# Patient Record
Sex: MALE | Race: WHITE | NOT HISPANIC OR LATINO | Employment: OTHER | URBAN - METROPOLITAN AREA
[De-identification: names, ages, dates, MRNs, and addresses within clinical notes are randomized per-mention and may not be internally consistent; named-entity substitution may affect disease eponyms.]

---

## 2022-09-29 ENCOUNTER — TELEPHONE (OUTPATIENT)
Dept: SCHEDULING | Facility: IMAGING CENTER | Age: 57
End: 2022-09-29

## 2022-09-30 SDOH — HEALTH STABILITY: PHYSICAL HEALTH: ON AVERAGE, HOW MANY MINUTES DO YOU ENGAGE IN EXERCISE AT THIS LEVEL?: 20 MIN

## 2022-09-30 SDOH — ECONOMIC STABILITY: HOUSING INSECURITY
IN THE LAST 12 MONTHS, WAS THERE A TIME WHEN YOU DID NOT HAVE A STEADY PLACE TO SLEEP OR SLEPT IN A SHELTER (INCLUDING NOW)?: NO

## 2022-09-30 SDOH — ECONOMIC STABILITY: TRANSPORTATION INSECURITY
IN THE PAST 12 MONTHS, HAS LACK OF TRANSPORTATION KEPT YOU FROM MEETINGS, WORK, OR FROM GETTING THINGS NEEDED FOR DAILY LIVING?: NO

## 2022-09-30 SDOH — ECONOMIC STABILITY: HOUSING INSECURITY: IN THE LAST 12 MONTHS, HOW MANY PLACES HAVE YOU LIVED?: 2

## 2022-09-30 SDOH — ECONOMIC STABILITY: FOOD INSECURITY: WITHIN THE PAST 12 MONTHS, YOU WORRIED THAT YOUR FOOD WOULD RUN OUT BEFORE YOU GOT MONEY TO BUY MORE.: NEVER TRUE

## 2022-09-30 SDOH — HEALTH STABILITY: PHYSICAL HEALTH: ON AVERAGE, HOW MANY DAYS PER WEEK DO YOU ENGAGE IN MODERATE TO STRENUOUS EXERCISE (LIKE A BRISK WALK)?: 4 DAYS

## 2022-09-30 SDOH — ECONOMIC STABILITY: INCOME INSECURITY: IN THE LAST 12 MONTHS, WAS THERE A TIME WHEN YOU WERE NOT ABLE TO PAY THE MORTGAGE OR RENT ON TIME?: NO

## 2022-09-30 SDOH — HEALTH STABILITY: MENTAL HEALTH
STRESS IS WHEN SOMEONE FEELS TENSE, NERVOUS, ANXIOUS, OR CAN'T SLEEP AT NIGHT BECAUSE THEIR MIND IS TROUBLED. HOW STRESSED ARE YOU?: VERY MUCH

## 2022-09-30 SDOH — ECONOMIC STABILITY: FOOD INSECURITY: WITHIN THE PAST 12 MONTHS, THE FOOD YOU BOUGHT JUST DIDN'T LAST AND YOU DIDN'T HAVE MONEY TO GET MORE.: NEVER TRUE

## 2022-09-30 SDOH — ECONOMIC STABILITY: TRANSPORTATION INSECURITY
IN THE PAST 12 MONTHS, HAS THE LACK OF TRANSPORTATION KEPT YOU FROM MEDICAL APPOINTMENTS OR FROM GETTING MEDICATIONS?: NO

## 2022-09-30 SDOH — ECONOMIC STABILITY: INCOME INSECURITY: HOW HARD IS IT FOR YOU TO PAY FOR THE VERY BASICS LIKE FOOD, HOUSING, MEDICAL CARE, AND HEATING?: NOT HARD AT ALL

## 2022-09-30 SDOH — ECONOMIC STABILITY: TRANSPORTATION INSECURITY
IN THE PAST 12 MONTHS, HAS LACK OF RELIABLE TRANSPORTATION KEPT YOU FROM MEDICAL APPOINTMENTS, MEETINGS, WORK OR FROM GETTING THINGS NEEDED FOR DAILY LIVING?: NO

## 2022-09-30 ASSESSMENT — SOCIAL DETERMINANTS OF HEALTH (SDOH)
HOW OFTEN DO YOU GET TOGETHER WITH FRIENDS OR RELATIVES?: NEVER
HOW OFTEN DO YOU HAVE SIX OR MORE DRINKS ON ONE OCCASION: NEVER
IN A TYPICAL WEEK, HOW MANY TIMES DO YOU TALK ON THE PHONE WITH FAMILY, FRIENDS, OR NEIGHBORS?: ONCE A WEEK
DO YOU BELONG TO ANY CLUBS OR ORGANIZATIONS SUCH AS CHURCH GROUPS UNIONS, FRATERNAL OR ATHLETIC GROUPS, OR SCHOOL GROUPS?: YES
HOW OFTEN DO YOU ATTENT MEETINGS OF THE CLUB OR ORGANIZATION YOU BELONG TO?: NEVER
HOW OFTEN DO YOU HAVE A DRINK CONTAINING ALCOHOL: NEVER
HOW OFTEN DO YOU ATTENT MEETINGS OF THE CLUB OR ORGANIZATION YOU BELONG TO?: NEVER
HOW OFTEN DO YOU GET TOGETHER WITH FRIENDS OR RELATIVES?: NEVER
HOW OFTEN DO YOU ATTEND CHURCH OR RELIGIOUS SERVICES?: MORE THAN 4 TIMES PER YEAR
HOW MANY DRINKS CONTAINING ALCOHOL DO YOU HAVE ON A TYPICAL DAY WHEN YOU ARE DRINKING: PATIENT DOES NOT DRINK
DO YOU BELONG TO ANY CLUBS OR ORGANIZATIONS SUCH AS CHURCH GROUPS UNIONS, FRATERNAL OR ATHLETIC GROUPS, OR SCHOOL GROUPS?: YES
IN A TYPICAL WEEK, HOW MANY TIMES DO YOU TALK ON THE PHONE WITH FAMILY, FRIENDS, OR NEIGHBORS?: ONCE A WEEK
HOW OFTEN DO YOU ATTEND CHURCH OR RELIGIOUS SERVICES?: MORE THAN 4 TIMES PER YEAR
WITHIN THE PAST 12 MONTHS, YOU WORRIED THAT YOUR FOOD WOULD RUN OUT BEFORE YOU GOT THE MONEY TO BUY MORE: NEVER TRUE
HOW HARD IS IT FOR YOU TO PAY FOR THE VERY BASICS LIKE FOOD, HOUSING, MEDICAL CARE, AND HEATING?: NOT HARD AT ALL

## 2022-09-30 ASSESSMENT — LIFESTYLE VARIABLES
HOW OFTEN DO YOU HAVE SIX OR MORE DRINKS ON ONE OCCASION: NEVER
HOW OFTEN DO YOU HAVE A DRINK CONTAINING ALCOHOL: NEVER
SKIP TO QUESTIONS 9-10: 1
AUDIT-C TOTAL SCORE: 0
HOW MANY STANDARD DRINKS CONTAINING ALCOHOL DO YOU HAVE ON A TYPICAL DAY: PATIENT DOES NOT DRINK

## 2022-10-03 ENCOUNTER — OFFICE VISIT (OUTPATIENT)
Dept: MEDICAL GROUP | Facility: PHYSICIAN GROUP | Age: 57
End: 2022-10-03
Payer: OTHER GOVERNMENT

## 2022-10-03 VITALS
OXYGEN SATURATION: 98 % | WEIGHT: 242 LBS | TEMPERATURE: 97 F | DIASTOLIC BLOOD PRESSURE: 90 MMHG | RESPIRATION RATE: 16 BRPM | SYSTOLIC BLOOD PRESSURE: 134 MMHG | BODY MASS INDEX: 32.07 KG/M2 | HEART RATE: 100 BPM | HEIGHT: 73 IN

## 2022-10-03 DIAGNOSIS — I10 PRIMARY HYPERTENSION: ICD-10-CM

## 2022-10-03 DIAGNOSIS — E78.2 MIXED HYPERLIPIDEMIA: ICD-10-CM

## 2022-10-03 DIAGNOSIS — F43.10 PTSD (POST-TRAUMATIC STRESS DISORDER): ICD-10-CM

## 2022-10-03 DIAGNOSIS — M54.42 CHRONIC BILATERAL LOW BACK PAIN WITH LEFT-SIDED SCIATICA: ICD-10-CM

## 2022-10-03 DIAGNOSIS — G89.29 CHRONIC BILATERAL LOW BACK PAIN WITH LEFT-SIDED SCIATICA: ICD-10-CM

## 2022-10-03 DIAGNOSIS — Z85.89 HX OF SQUAMOUS CELL CARCINOMA: ICD-10-CM

## 2022-10-03 DIAGNOSIS — Z00.00 PHYSICAL EXAM, ANNUAL: ICD-10-CM

## 2022-10-03 DIAGNOSIS — Z23 NEED FOR VACCINATION: ICD-10-CM

## 2022-10-03 PROBLEM — S42.411A CLOSED SUPRACONDYLAR FRACTURE OF RIGHT HUMERUS: Status: ACTIVE | Noted: 2020-11-08

## 2022-10-03 PROCEDURE — 96372 THER/PROPH/DIAG INJ SC/IM: CPT | Performed by: PHYSICIAN ASSISTANT

## 2022-10-03 PROCEDURE — 90686 IIV4 VACC NO PRSV 0.5 ML IM: CPT | Performed by: PHYSICIAN ASSISTANT

## 2022-10-03 PROCEDURE — 90471 IMMUNIZATION ADMIN: CPT | Performed by: PHYSICIAN ASSISTANT

## 2022-10-03 PROCEDURE — 99204 OFFICE O/P NEW MOD 45 MIN: CPT | Mod: 25 | Performed by: PHYSICIAN ASSISTANT

## 2022-10-03 RX ORDER — LEVOFLOXACIN 750 MG/1
TABLET, FILM COATED ORAL
COMMUNITY
End: 2023-01-10

## 2022-10-03 RX ORDER — METHOCARBAMOL 500 MG/1
TABLET, FILM COATED ORAL
COMMUNITY
Start: 2022-07-21 | End: 2023-01-10

## 2022-10-03 RX ORDER — FLUTICASONE PROPIONATE 110 UG/1
AEROSOL, METERED RESPIRATORY (INHALATION)
COMMUNITY
End: 2023-01-10

## 2022-10-03 RX ORDER — ACETAMINOPHEN AND CODEINE PHOSPHATE 300; 30 MG/1; MG/1
TABLET ORAL
COMMUNITY
End: 2023-08-01

## 2022-10-03 RX ORDER — BUSPIRONE HYDROCHLORIDE 5 MG/1
TABLET ORAL
COMMUNITY
End: 2023-01-10

## 2022-10-03 RX ORDER — ZOSTER VACCINE RECOMBINANT, ADJUVANTED 50 MCG/0.5
KIT INTRAMUSCULAR
COMMUNITY
End: 2023-01-10

## 2022-10-03 RX ORDER — KETOROLAC TROMETHAMINE 30 MG/ML
30 INJECTION, SOLUTION INTRAMUSCULAR; INTRAVENOUS ONCE
Status: COMPLETED | OUTPATIENT
Start: 2022-10-03 | End: 2022-10-03

## 2022-10-03 RX ORDER — ATORVASTATIN CALCIUM 20 MG/1
TABLET, FILM COATED ORAL
COMMUNITY
End: 2023-10-31

## 2022-10-03 RX ORDER — HYDROCODONE BITARTRATE AND ACETAMINOPHEN 10; 325 MG/1; MG/1
TABLET ORAL
COMMUNITY
End: 2023-01-10

## 2022-10-03 RX ORDER — PANTOPRAZOLE SODIUM 40 MG/1
TABLET, DELAYED RELEASE ORAL
COMMUNITY
End: 2023-10-31 | Stop reason: SDUPTHER

## 2022-10-03 RX ORDER — TRAMADOL HYDROCHLORIDE 50 MG/1
TABLET ORAL
COMMUNITY
End: 2023-01-10

## 2022-10-03 RX ORDER — IBUPROFEN 200 MG
800 TABLET ORAL
COMMUNITY

## 2022-10-03 RX ORDER — OXYCODONE HYDROCHLORIDE AND ACETAMINOPHEN 5; 325 MG/1; MG/1
TABLET ORAL
COMMUNITY
End: 2023-01-10

## 2022-10-03 RX ORDER — ENALAPRIL MALEATE AND HYDROCHLOROTHIAZIDE 10; 25 MG/1; MG/1
1 TABLET ORAL DAILY
COMMUNITY
Start: 2022-07-12 | End: 2023-05-08 | Stop reason: SDUPTHER

## 2022-10-03 RX ORDER — BENZONATATE 200 MG/1
CAPSULE ORAL
COMMUNITY
End: 2023-01-10

## 2022-10-03 RX ORDER — TIZANIDINE HYDROCHLORIDE 4 MG/1
4 CAPSULE, GELATIN COATED ORAL EVERY EVENING
COMMUNITY
Start: 2022-09-13 | End: 2023-01-10

## 2022-10-03 RX ORDER — CITALOPRAM 40 MG/1
TABLET ORAL
COMMUNITY
Start: 2022-09-28 | End: 2023-10-31

## 2022-10-03 RX ORDER — AMOXICILLIN AND CLAVULANATE POTASSIUM 875; 125 MG/1; MG/1
TABLET, FILM COATED ORAL
COMMUNITY
End: 2023-01-10

## 2022-10-03 RX ORDER — FLUOXETINE 10 MG/1
10 TABLET, FILM COATED ORAL DAILY
COMMUNITY
End: 2023-01-10

## 2022-10-03 RX ORDER — CYCLOBENZAPRINE HCL 10 MG
TABLET ORAL
COMMUNITY
End: 2023-01-10

## 2022-10-03 RX ORDER — ENALAPRIL MALEATE 5 MG/1
TABLET ORAL
COMMUNITY
End: 2023-01-10

## 2022-10-03 RX ORDER — PREDNISONE 10 MG/1
TABLET ORAL
COMMUNITY
Start: 2022-07-21 | End: 2023-01-10

## 2022-10-03 RX ORDER — SILDENAFIL 100 MG/1
TABLET, FILM COATED ORAL
COMMUNITY
Start: 2022-08-09 | End: 2023-01-10

## 2022-10-03 RX ORDER — INFLUENZA A VIRUS A/IDAHO/07/2018 (H1N1) ANTIGEN (MDCK CELL DERIVED, PROPIOLACTONE INACTIVATED, INFLUENZA A VIRUS A/INDIANA/08/2018 (H3N2) ANTIGEN (MDCK CELL DERIVED, PROPIOLACTONE INACTIVATED), INFLUENZA B VIRUS B/SINGAPORE/INFTT-16-0610/2016 ANTIGEN (MDCK CELL DERIVED, PROPIOLACTONE INACTIVATED), INFLUENZA B VIRUS B/IOWA/06/2017 ANTIGEN (MDCK CELL DERIVED, PROPIOLACTONE INACTIVATED) 15; 15; 15; 15 UG/.5ML; UG/.5ML; UG/.5ML; UG/.5ML
INJECTION, SUSPENSION INTRAMUSCULAR
COMMUNITY
End: 2023-01-10

## 2022-10-03 RX ORDER — CEPHALEXIN 500 MG/1
CAPSULE ORAL
COMMUNITY
End: 2023-01-10

## 2022-10-03 RX ORDER — GABAPENTIN 300 MG/1
CAPSULE ORAL
COMMUNITY
End: 2023-01-10

## 2022-10-03 RX ORDER — HYDROMORPHONE HYDROCHLORIDE 4 MG/1
TABLET ORAL
COMMUNITY
End: 2023-01-10

## 2022-10-03 RX ORDER — POLYETHYLENE GLYCOL 3350 17 G/17G
POWDER, FOR SOLUTION ORAL
COMMUNITY
End: 2023-01-10

## 2022-10-03 RX ORDER — BUPROPION HYDROCHLORIDE 300 MG/1
TABLET ORAL
COMMUNITY
Start: 2022-07-21 | End: 2023-10-31 | Stop reason: SDUPTHER

## 2022-10-03 RX ADMIN — KETOROLAC TROMETHAMINE 30 MG: 30 INJECTION, SOLUTION INTRAMUSCULAR; INTRAVENOUS at 10:07

## 2022-10-03 RX ADMIN — KETOROLAC TROMETHAMINE 30 MG: 30 INJECTION, SOLUTION INTRAMUSCULAR; INTRAVENOUS at 10:08

## 2022-10-03 NOTE — PROGRESS NOTES
CC:    Chief Complaint   Patient presents with    Establish Care     Headaches and heartburn     Referral Needed     Pain management        HISTORY OF THE PRESENT ILLNESS: Patient is a 57 y.o. male presenting to establish primary care     Pt gets epidural shots every 3-4 months for chronic lower back pain. Requesting toradol injection today.   Pt has hx of frequent headaches. Has a lot of stress now with imminent penitentiary for the [employer]. Has to lay down in dark room.   Pt having coughing fits when he lays down at night. Taking protonix 40mg, TUMS, juliane seltzer. Has intense heartburn and has been present for several years.   Pt on lipitor for HLD.   Pt on enalapril/HCTZ for HTN.   Pt on Celexa and wellbutrin for depression and PTSD. Seeing a counselor. Hard to say if medications are helping.   Has hx of squamous cell carcinoma on his face.   Had colonoscopy 4 years in the .     No problem-specific Assessment & Plan notes found for this encounter.    Allergies: Patient has no allergy information on record.    Current Outpatient Medications Ordered in Epic   Medication Sig Dispense Refill    Acetaminophen-Codeine 300-30 MG Tab acetaminophen 300 mg-codeine 30 mg tablet   take 1 tablet by mouth daily if needed for pain      atorvastatin (LIPITOR) 20 MG Tab atorvastatin 20 mg tablet   take 1 tablet by mouth once daily      buPROPion (WELLBUTRIN XL) 300 MG XL tablet bupropion HCl  mg 24 hr tablet, extended release   take 1 tablet by mouth once daily      citalopram (CELEXA) 40 MG Tab       cyclobenzaprine (FLEXERIL) 10 mg Tab cyclobenzaprine 10 mg tablet   take 1 tablet by mouth three times a day if needed for SPASM      enalapril (VASOTEC) 5 MG Tab enalapril maleate 5 mg tablet      enalapril-hydrochlorthiazide (VASERETIC) 10-25 MG per tablet Take 1 Tablet by mouth every day.      fluticasone (FLOVENT HFA) 110 MCG/ACT Aerosol Flovent  mcg/actuation aerosol inhaler   inhale 2 puffs by mouth and INTO  THE LUNGS twice a day      HYDROcodone/acetaminophen (NORCO)  MG Tab       ibuprofen (MOTRIN) 200 MG Tab Take 800 mg by mouth.      pantoprazole (PROTONIX) 40 MG Tablet Delayed Response pantoprazole 40 mg tablet,delayed release   take 1 tablet by mouth once daily      sildenafil citrate (VIAGRA) 100 MG tablet take 1 tablet by mouth once daily for 30 DAYS if needed for ERECT...  (REFER TO PRESCRIPTION NOTES).      tizanidine (ZANAFLEX) 4 MG capsule Take 4 mg by mouth every evening.      amoxicillin-clavulanate (AUGMENTIN) 875-125 MG Tab amoxicillin 875 mg-potassium clavulanate 125 mg tablet   take 1 tablet by mouth every 12 hours for 7 days (Patient not taking: Reported on 10/3/2022)      benzonatate (TESSALON) 200 MG capsule benzonatate 200 mg capsule   take 1 capsule by mouth every 8 hours if needed cough (Patient not taking: Reported on 10/3/2022)      busPIRone (BUSPAR) 5 MG tablet buspirone 5 mg tablet   take 1 tablet by mouth three times a day if needed for anxiety (Patient not taking: Reported on 10/3/2022)      cephALEXin (KEFLEX) 500 MG Cap cephalexin 500 mg capsule (Patient not taking: Reported on 10/3/2022)      fluoxetine (PROZAC) 10 MG tablet Take 10 mg by mouth every day. (Patient not taking: Reported on 10/3/2022)      gabapentin (NEURONTIN) 300 MG Cap gabapentin 300 mg capsule   take 1 capsule by mouth three times a day (Patient not taking: Reported on 10/3/2022)      HYDROmorphone (DILAUDID) 4 MG Tab hydromorphone 4 mg tablet (Patient not taking: Reported on 10/3/2022)      levoFLOXacin (LEVAQUIN) 750 MG tablet levofloxacin 750 mg tablet   take 1 tablet by mouth once daily (Patient not taking: Reported on 10/3/2022)      methocarbamol (ROBAXIN) 500 MG Tab  (Patient not taking: Reported on 10/3/2022)      oxyCODONE-acetaminophen (PERCOCET) 5-325 MG Tab oxycodone-acetaminophen 5 mg-325 mg tablet   take 1 tablet by mouth every 6 hours if needed for pain (Patient not taking: Reported on 10/3/2022)       polyethylene glycol 3350 (MIRALAX) 17 GM/SCOOP Powder polyethylene glycol 3350 17 gram/dose oral powder   take 17GM (DISSOLVED IN WATER) by mouth once daily (Patient not taking: Reported on 10/3/2022)      predniSONE (DELTASONE) 10 MG Tab  (Patient not taking: Reported on 10/3/2022)      traMADol (ULTRAM) 50 MG Tab tramadol 50 mg tablet   take 1 tablet by mouth every 12 hours if needed for pain (Patient not taking: Reported on 10/3/2022)       No current [facility]-ordered facility-administered medications on file.       No past medical history on file.    No past surgical history on file.    Social History     Tobacco Use    Smoking status: Never    Smokeless tobacco: Never   Vaping Use    Vaping Use: Never used   Substance Use Topics    Alcohol use: Never    Drug use: Never       Social History     Social History Narrative    Not on file       No family history on file.    ROS:     - Constitutional: Negative for fever, chills, unexpected weight change, and fatigue/generalized weakness.     - HEENT: Negative for headaches, vision changes, hearing changes, ear pain, ear discharge, rhinorrhea, sinus congestion, sore throat, and neck pain.      - Respiratory: Negative for cough, sputum production, chest congestion, dyspnea, wheezing, and crackles.      - Cardiovascular: Negative for chest pain, palpitations, orthopnea, and bilateral lower extremity edema.     - Gastrointestinal: Negative for heartburn, nausea, vomiting, abdominal pain, hematochezia, melena, diarrhea, constipation, and greasy/foul-smelling stools.     - Genitourinary: Negative for dysuria, polyuria, hematuria, pyuria, urinary urgency, and urinary incontinence.     - Musculoskeletal:Positive for LBP.  Negative for myalgias,  and joint pain.     - Skin: Negative for rash, itching, cyanotic skin color change.     - Neurological:Positive for headaches.  Negative for dizziness, tingling, tremors, focal sensory deficit, focal weakness     -  "Endo/Heme/Allergies: Does not bruise/bleed easily.     - Psychiatric/Behavioral: Positive for depression, PTSD. Negative for suicidal/homicidal ideation and memory loss.            .      Exam: BP (!) 134/90   Pulse 100   Temp 36.1 °C (97 °F) (Temporal)   Resp 16   Ht 1.854 m (6' 1\")   Wt 110 kg (242 lb)   SpO2 98%  Body mass index is 31.93 kg/m².    General: Normal appearing. No acute distress.  Skin: Warm and dry.  No obvious lesions.  HEENT: Normocephalic. Eyes conjunctiva clear lids without ptosis, ears normal shape and contour  Neurologic: Grossly nonfocal, A&O x3, gait normal,  Musculoskeletal: No deformity or swelling.   Extremities: No extremity cyanosis, clubbing, or edema.  Psych: Normal mood and affect. Judgment and insight is normal.    Please note that this dictation was created using voice recognition software. I have made every reasonable attempt to correct obvious errors, but I expect that there are errors of grammar and possibly content that I did not discover before finalizing the note.      Assessment/Plan  1. Need for vaccination    - INFLUENZA VACCINE QUAD INJ (PF)    2. Hx of squamous cell carcinoma  Referral placed.   - Referral to Dermatology    3. Physical exam, annual  Labs printed.   - CBC WITH DIFFERENTIAL; Future  - Comp Metabolic Panel; Future  - HEMOGLOBIN A1C; Future  - Lipid Profile; Future  - TSH WITH REFLEX TO FT4; Future    4. PTSD (post-traumatic stress disorder)  Referral placed.   - Referral to Psychiatry    5. Chronic bilateral low back pain with left-sided sciatica  Toradol given in clinic today. Referral to pain management placed.   - Referral to Pain Management  - ketorolac (TORADOL) injection 30 mg  - ketorolac (TORADOL) injection 30 mg     6. Primary hypertension  Borderline high today.     7. Mixed hyperlipidemia  Continue lipitor    "

## 2022-12-16 ENCOUNTER — HOSPITAL ENCOUNTER (OUTPATIENT)
Dept: LAB | Facility: MEDICAL CENTER | Age: 57
End: 2022-12-16
Attending: PHYSICIAN ASSISTANT
Payer: OTHER GOVERNMENT

## 2022-12-16 DIAGNOSIS — Z00.00 PHYSICAL EXAM, ANNUAL: ICD-10-CM

## 2022-12-16 LAB
ALBUMIN SERPL BCP-MCNC: 4.1 G/DL (ref 3.2–4.9)
ALBUMIN/GLOB SERPL: 1.6 G/DL
ALP SERPL-CCNC: 95 U/L (ref 30–99)
ALT SERPL-CCNC: 19 U/L (ref 2–50)
ANION GAP SERPL CALC-SCNC: 11 MMOL/L (ref 7–16)
AST SERPL-CCNC: 12 U/L (ref 12–45)
BASOPHILS # BLD AUTO: 0.9 % (ref 0–1.8)
BASOPHILS # BLD: 0.09 K/UL (ref 0–0.12)
BILIRUB SERPL-MCNC: 0.2 MG/DL (ref 0.1–1.5)
BUN SERPL-MCNC: 12 MG/DL (ref 8–22)
CALCIUM ALBUM COR SERPL-MCNC: 9.2 MG/DL (ref 8.5–10.5)
CALCIUM SERPL-MCNC: 9.3 MG/DL (ref 8.5–10.5)
CHLORIDE SERPL-SCNC: 106 MMOL/L (ref 96–112)
CHOLEST SERPL-MCNC: 279 MG/DL (ref 100–199)
CO2 SERPL-SCNC: 24 MMOL/L (ref 20–33)
CREAT SERPL-MCNC: 0.87 MG/DL (ref 0.5–1.4)
EOSINOPHIL # BLD AUTO: 0.21 K/UL (ref 0–0.51)
EOSINOPHIL NFR BLD: 2.2 % (ref 0–6.9)
ERYTHROCYTE [DISTWIDTH] IN BLOOD BY AUTOMATED COUNT: 46.5 FL (ref 35.9–50)
EST. AVERAGE GLUCOSE BLD GHB EST-MCNC: 128 MG/DL
FASTING STATUS PATIENT QL REPORTED: NORMAL
GFR SERPLBLD CREATININE-BSD FMLA CKD-EPI: 101 ML/MIN/1.73 M 2
GLOBULIN SER CALC-MCNC: 2.5 G/DL (ref 1.9–3.5)
GLUCOSE SERPL-MCNC: 89 MG/DL (ref 65–99)
HBA1C MFR BLD: 6.1 % (ref 4–5.6)
HCT VFR BLD AUTO: 41.6 % (ref 42–52)
HDLC SERPL-MCNC: 36 MG/DL
HGB BLD-MCNC: 13.5 G/DL (ref 14–18)
IMM GRANULOCYTES # BLD AUTO: 0.03 K/UL (ref 0–0.11)
IMM GRANULOCYTES NFR BLD AUTO: 0.3 % (ref 0–0.9)
LDLC SERPL CALC-MCNC: 194 MG/DL
LYMPHOCYTES # BLD AUTO: 2.06 K/UL (ref 1–4.8)
LYMPHOCYTES NFR BLD: 21.4 % (ref 22–41)
MCH RBC QN AUTO: 26.9 PG (ref 27–33)
MCHC RBC AUTO-ENTMCNC: 32.5 G/DL (ref 33.7–35.3)
MCV RBC AUTO: 83 FL (ref 81.4–97.8)
MONOCYTES # BLD AUTO: 0.64 K/UL (ref 0–0.85)
MONOCYTES NFR BLD AUTO: 6.6 % (ref 0–13.4)
NEUTROPHILS # BLD AUTO: 6.6 K/UL (ref 1.82–7.42)
NEUTROPHILS NFR BLD: 68.6 % (ref 44–72)
NRBC # BLD AUTO: 0 K/UL
NRBC BLD-RTO: 0 /100 WBC
PLATELET # BLD AUTO: 227 K/UL (ref 164–446)
PMV BLD AUTO: 9.7 FL (ref 9–12.9)
POTASSIUM SERPL-SCNC: 4.4 MMOL/L (ref 3.6–5.5)
PROT SERPL-MCNC: 6.6 G/DL (ref 6–8.2)
RBC # BLD AUTO: 5.01 M/UL (ref 4.7–6.1)
SODIUM SERPL-SCNC: 141 MMOL/L (ref 135–145)
TRIGL SERPL-MCNC: 244 MG/DL (ref 0–149)
TSH SERPL DL<=0.005 MIU/L-ACNC: 2.88 UIU/ML (ref 0.38–5.33)
WBC # BLD AUTO: 9.6 K/UL (ref 4.8–10.8)

## 2022-12-16 PROCEDURE — 83036 HEMOGLOBIN GLYCOSYLATED A1C: CPT

## 2022-12-16 PROCEDURE — 36415 COLL VENOUS BLD VENIPUNCTURE: CPT

## 2022-12-16 PROCEDURE — 85025 COMPLETE CBC W/AUTO DIFF WBC: CPT

## 2022-12-16 PROCEDURE — 80061 LIPID PANEL: CPT

## 2022-12-16 PROCEDURE — 80053 COMPREHEN METABOLIC PANEL: CPT

## 2022-12-16 PROCEDURE — 84443 ASSAY THYROID STIM HORMONE: CPT

## 2022-12-19 ENCOUNTER — OFFICE VISIT (OUTPATIENT)
Dept: MEDICAL GROUP | Facility: PHYSICIAN GROUP | Age: 57
End: 2022-12-19
Payer: OTHER GOVERNMENT

## 2022-12-19 VITALS
DIASTOLIC BLOOD PRESSURE: 90 MMHG | RESPIRATION RATE: 14 BRPM | HEART RATE: 86 BPM | TEMPERATURE: 97.1 F | HEIGHT: 73 IN | WEIGHT: 212.6 LBS | OXYGEN SATURATION: 97 % | SYSTOLIC BLOOD PRESSURE: 118 MMHG | BODY MASS INDEX: 28.18 KG/M2

## 2022-12-19 DIAGNOSIS — Z00.00 PHYSICAL EXAM, ANNUAL: ICD-10-CM

## 2022-12-19 DIAGNOSIS — F43.10 PTSD (POST-TRAUMATIC STRESS DISORDER): ICD-10-CM

## 2022-12-19 DIAGNOSIS — M54.42 CHRONIC BILATERAL LOW BACK PAIN WITH LEFT-SIDED SCIATICA: ICD-10-CM

## 2022-12-19 DIAGNOSIS — E78.2 MIXED HYPERLIPIDEMIA: ICD-10-CM

## 2022-12-19 DIAGNOSIS — I10 PRIMARY HYPERTENSION: ICD-10-CM

## 2022-12-19 DIAGNOSIS — G89.29 CHRONIC BILATERAL LOW BACK PAIN WITH LEFT-SIDED SCIATICA: ICD-10-CM

## 2022-12-19 DIAGNOSIS — R73.03 PREDIABETES: ICD-10-CM

## 2022-12-19 PROBLEM — E78.5 HYPERLIPIDEMIA: Status: ACTIVE | Noted: 2022-12-19

## 2022-12-19 PROBLEM — M54.50 LOW BACK PAIN: Status: ACTIVE | Noted: 2022-10-03

## 2022-12-19 PROCEDURE — 99396 PREV VISIT EST AGE 40-64: CPT | Performed by: PHYSICIAN ASSISTANT

## 2022-12-19 RX ORDER — METHOCARBAMOL 500 MG/1
1000 TABLET, FILM COATED ORAL
COMMUNITY
Start: 2022-11-30 | End: 2023-01-10

## 2022-12-19 RX ORDER — PANTOPRAZOLE SODIUM 40 MG/1
TABLET, DELAYED RELEASE ORAL
COMMUNITY
Start: 2022-08-09 | End: 2023-01-10

## 2022-12-19 RX ORDER — BUPROPION HYDROCHLORIDE 300 MG/1
300 TABLET ORAL
COMMUNITY
Start: 2022-07-21 | End: 2023-08-01

## 2022-12-19 RX ORDER — SILDENAFIL 100 MG/1
100 TABLET, FILM COATED ORAL
COMMUNITY
Start: 2022-07-21 | End: 2023-01-10

## 2022-12-19 RX ORDER — ATORVASTATIN CALCIUM 80 MG/1
TABLET, FILM COATED ORAL
COMMUNITY
Start: 2022-07-21 | End: 2023-05-08 | Stop reason: SDUPTHER

## 2022-12-19 ASSESSMENT — PATIENT HEALTH QUESTIONNAIRE - PHQ9
CLINICAL INTERPRETATION OF PHQ2 SCORE: 2
5. POOR APPETITE OR OVEREATING: 1 - SEVERAL DAYS
SUM OF ALL RESPONSES TO PHQ QUESTIONS 1-9: 10

## 2022-12-19 ASSESSMENT — FIBROSIS 4 INDEX: FIB4 SCORE: 0.68

## 2022-12-20 NOTE — PROGRESS NOTES
CC:    Chief Complaint   Patient presents with    Lab Results       HISTORY OF THE PRESENT ILLNESS:  57 y.o. male presenting for annual physical exam.   Pt has not been taking his Lipitor and it shows in his labs.   Pt's A1C mildly elevated at 6.1%. Admits that he eats a lot of candy.       No problem-specific Assessment & Plan notes found for this encounter.    Allergies: Patient has no known allergies.    Current Outpatient Medications Ordered in Epic   Medication Sig Dispense Refill    amoxicillin-clavulanate (AUGMENTIN) 875-125 MG Tab       atorvastatin (LIPITOR) 20 MG Tab atorvastatin 20 mg tablet   take 1 tablet by mouth once daily      buPROPion (WELLBUTRIN XL) 300 MG XL tablet bupropion HCl  mg 24 hr tablet, extended release   take 1 tablet by mouth once daily      citalopram (CELEXA) 40 MG Tab       cyclobenzaprine (FLEXERIL) 10 mg Tab       enalapril-hydrochlorthiazide (VASERETIC) 10-25 MG per tablet Take 1 Tablet by mouth every day.      HYDROcodone/acetaminophen (NORCO)  MG Tab       ibuprofen (MOTRIN) 200 MG Tab Take 4 Tablets by mouth.      methocarbamol (ROBAXIN) 500 MG Tab       pantoprazole (PROTONIX) 40 MG Tablet Delayed Response pantoprazole 40 mg tablet,delayed release   take 1 tablet by mouth once daily      sildenafil citrate (VIAGRA) 100 MG tablet take 1 tablet by mouth once daily for 30 DAYS if needed for ERECT...  (REFER TO PRESCRIPTION NOTES).      tizanidine (ZANAFLEX) 4 MG capsule Take 1 Capsule by mouth every evening.      atorvastatin (LIPITOR) 80 MG tablet Take  by mouth. (Patient not taking: Reported on 12/19/2022)      buPROPion (WELLBUTRIN XL) 300 MG XL tablet Take 300 mg by mouth. (Patient not taking: Reported on 12/19/2022)      methocarbamol (ROBAXIN) 500 MG Tab Take 1,000 mg by mouth. (Patient not taking: Reported on 12/19/2022)      pantoprazole (PROTONIX) 40 MG Tablet Delayed Response Take  by mouth. (Patient not taking: Reported on 12/19/2022)      sildenafil  citrate (VIAGRA) 100 MG tablet Take 100 mg by mouth. (Patient not taking: Reported on 12/19/2022)      ondansetron (ZOFRAN) 4 MG Tab tablet Take 1 Tablet by mouth every four hours as needed for Nausea/Vomiting for up to 5 doses. (Patient not taking: Reported on 12/19/2022) 20 Tablet 0    Acetaminophen-Codeine 300-30 MG Tab acetaminophen 300 mg-codeine 30 mg tablet   take 1 tablet by mouth daily if needed for pain      benzonatate (TESSALON) 200 MG capsule benzonatate 200 mg capsule   take 1 capsule by mouth every 8 hours if needed cough (Patient not taking: Reported on 10/3/2022)      busPIRone (BUSPAR) 5 MG tablet buspirone 5 mg tablet   take 1 tablet by mouth three times a day if needed for anxiety (Patient not taking: Reported on 12/19/2022)      cephALEXin (KEFLEX) 500 MG Cap cephalexin 500 mg capsule (Patient not taking: Reported on 10/3/2022)      enalapril (VASOTEC) 5 MG Tab enalapril maleate 5 mg tablet (Patient not taking: Reported on 12/19/2022)      fluoxetine (PROZAC) 10 MG tablet Take 10 mg by mouth every day. (Patient not taking: Reported on 11/16/2022)      fluticasone (FLOVENT HFA) 110 MCG/ACT Aerosol Flovent  mcg/actuation aerosol inhaler   inhale 2 puffs by mouth and INTO THE LUNGS twice a day (Patient not taking: Reported on 11/16/2022)      gabapentin (NEURONTIN) 300 MG Cap gabapentin 300 mg capsule   take 1 capsule by mouth three times a day (Patient not taking: Reported on 10/3/2022)      HYDROmorphone (DILAUDID) 4 MG Tab hydromorphone 4 mg tablet (Patient not taking: Reported on 10/3/2022)      levoFLOXacin (LEVAQUIN) 750 MG tablet levofloxacin 750 mg tablet   take 1 tablet by mouth once daily (Patient not taking: Reported on 10/3/2022)      oxyCODONE-acetaminophen (PERCOCET) 5-325 MG Tab oxycodone-acetaminophen 5 mg-325 mg tablet   take 1 tablet by mouth every 6 hours if needed for pain (Patient not taking: Reported on 10/3/2022)      polyethylene glycol 3350 (MIRALAX) 17 GM/SCOOP  Powder polyethylene glycol 3350 17 gram/dose oral powder   take 17GM (DISSOLVED IN WATER) by mouth once daily (Patient not taking: Reported on 10/3/2022)      predniSONE (DELTASONE) 10 MG Tab  (Patient not taking: Reported on 10/3/2022)      traMADol (ULTRAM) 50 MG Tab tramadol 50 mg tablet   take 1 tablet by mouth every 12 hours if needed for pain (Patient not taking: Reported on 11/16/2022)      Zoster Vac Recomb Adjuvanted (SHINGRIX) 50 MCG/0.5ML Recon Susp Shingrix (PF) 50 mcg/0.5 mL intramuscular suspension, kit (Patient not taking: Reported on 12/19/2022)      Influenza Vac Subunit Quad (FLUCELVAX QUADRIVALENT) 0.5 ML Suspension Prefilled Syringe injection Flucelvax Quad 5539-9702 (PF) 60 mcg (15 mcg x 4)/0.5 mL IM syringe (Patient not taking: Reported on 12/19/2022)       No current [facility]-ordered facility-administered medications on file.       Past Medical History:   Diagnosis Date    Hyperlipidemia     Hypertension        Past Surgical History:   Procedure Laterality Date    CERVICAL DISK AND FUSION ANTERIOR      ELBOW ARTHROSCOPY Right     FOOT SURGERY Right        Social History     Tobacco Use    Smoking status: Never    Smokeless tobacco: Never   Vaping Use    Vaping Use: Never used   Substance Use Topics    Alcohol use: Never    Drug use: Never       Social History     Social History Narrative    Not on file       Family History   Problem Relation Age of Onset    Cancer Father         carcinoid syndrome of liver       ROS:     - Constitutional: Negative for fever, chills, unexpected weight change, and fatigue/generalized weakness.     - HEENT: Negative for headaches, vision changes, hearing changes, ear pain, ear discharge, rhinorrhea, sinus congestion, sore throat, and neck pain.      - Respiratory: Negative for cough, sputum production, chest congestion, dyspnea, wheezing, and crackles.      - Cardiovascular: Negative for chest pain, palpitations, orthopnea, and bilateral lower extremity edema.     -  "Gastrointestinal: Negative for heartburn, nausea, vomiting, abdominal pain, hematochezia, melena, diarrhea, constipation, and greasy/foul-smelling stools.     - Genitourinary: Negative for dysuria, polyuria, hematuria, pyuria, urinary urgency, and urinary incontinence.     - Musculoskeletal:Positive for back pain.  Negative for myalgias,  and joint pain.     - Skin: Negative for rash, itching, cyanotic skin color change.     - Neurological: Negative for dizziness, tingling, tremors, focal sensory deficit, focal weakness and headaches.     - Endo/Heme/Allergies: Does not bruise/bleed easily.     - Psychiatric/Behavioral: Positive for anxiety, depression. Negative  suicidal/homicidal ideation and memory loss.            Health Maintenance  Cholesterol Screening: Fasting lipid panel  Diabetes Screening: Fasting blood sugar  Diet: Advised more lean meats, fruits, vegetables whole grains.  Exercise: Regular exercise. Patient is a   Substance Abuse: None  Safe in relationship Yes     Cancer screening  Colorectal Cancer Screenin       Exam: BP (!) 118/90   Pulse 86   Temp 36.2 °C (97.1 °F) (Temporal)   Resp 14   Ht 1.854 m (6' 1\")   Wt 96.4 kg (212 lb 9.6 oz)   SpO2 97%  Body mass index is 28.05 kg/m².    General: Normal appearing. No distress.  HEENT: Normocephalic. Eyes conjunctiva clear lids without ptosis, pupils equal and reactive to light accommodation, ears normal shape and contour, canals are clear bilaterally, tympanic membranes are benign, nasal mucosa benign, oropharynx is without erythema, edema or exudates.   Neck: Supple without JVD or bruit. No thyromegaly. No cervical lymphadenopathy  Pulmonary: Clear to ausculation.  Normal effort. No rales, ronchi, or wheezing.  Cardiovascular: Regular rate and rhythm without murmur, gallops or rubs  Neurologic: Grossly nonfocal, gait normal, normal muscle tone  Skin: Warm and dry.  No obvious lesions.  Musculoskeletal: No extremity cyanosis, " clubbing, or edema. No deformity  Psych: Normal mood and affect. Alert and oriented x3. Judgment and insight is normal.    Please note that this dictation was created using voice recognition software. I have made every reasonable attempt to correct obvious errors, but I expect that there are errors of grammar and possibly content that I did not discover before finalizing the note.      Assessment/Plan  1. Physical exam, annual  PE conducted.     2. Primary hypertension  Continue at current dose.     3. Chronic bilateral low back pain with left-sided sciatica  F/u with pain management    4. PTSD (post-traumatic stress disorder)  Continue celexa    5. Mixed hyperlipidemia  He will resume taking lipitor. Recheck in 6 months.   - Lipid Profile; Future    6. Prediabetes  We discussed weight loss and stopping eating sweets

## 2023-01-10 ENCOUNTER — OFFICE VISIT (OUTPATIENT)
Dept: PHYSICAL MEDICINE AND REHAB | Facility: MEDICAL CENTER | Age: 58
End: 2023-01-10
Payer: OTHER GOVERNMENT

## 2023-01-10 VITALS
TEMPERATURE: 97.2 F | SYSTOLIC BLOOD PRESSURE: 122 MMHG | BODY MASS INDEX: 32.84 KG/M2 | WEIGHT: 247.8 LBS | HEART RATE: 84 BPM | OXYGEN SATURATION: 98 % | HEIGHT: 73 IN | DIASTOLIC BLOOD PRESSURE: 70 MMHG

## 2023-01-10 DIAGNOSIS — F43.10 PTSD (POST-TRAUMATIC STRESS DISORDER): ICD-10-CM

## 2023-01-10 DIAGNOSIS — M54.16 LUMBAR RADICULOPATHY: ICD-10-CM

## 2023-01-10 DIAGNOSIS — M25.561 BILATERAL CHRONIC KNEE PAIN: ICD-10-CM

## 2023-01-10 DIAGNOSIS — M25.562 BILATERAL CHRONIC KNEE PAIN: ICD-10-CM

## 2023-01-10 DIAGNOSIS — M17.10 ARTHRITIS OF KNEE: ICD-10-CM

## 2023-01-10 DIAGNOSIS — M54.41 CHRONIC BILATERAL LOW BACK PAIN WITH BILATERAL SCIATICA: ICD-10-CM

## 2023-01-10 DIAGNOSIS — F41.8 DEPRESSION WITH ANXIETY: ICD-10-CM

## 2023-01-10 DIAGNOSIS — G89.29 CHRONIC BILATERAL LOW BACK PAIN WITH BILATERAL SCIATICA: ICD-10-CM

## 2023-01-10 DIAGNOSIS — G89.29 BILATERAL CHRONIC KNEE PAIN: ICD-10-CM

## 2023-01-10 DIAGNOSIS — M54.42 CHRONIC BILATERAL LOW BACK PAIN WITH BILATERAL SCIATICA: ICD-10-CM

## 2023-01-10 PROCEDURE — 99205 OFFICE O/P NEW HI 60 MIN: CPT | Performed by: PHYSICAL MEDICINE & REHABILITATION

## 2023-01-10 RX ORDER — HYDROCODONE BITARTRATE AND ACETAMINOPHEN 10; 325 MG/1; MG/1
1 TABLET ORAL EVERY 8 HOURS PRN
Qty: 90 TABLET | Refills: 0 | Status: SHIPPED | OUTPATIENT
Start: 2023-01-10 | End: 2023-02-09

## 2023-01-10 RX ORDER — ACETAMINOPHEN AND CODEINE PHOSPHATE 300; 60 MG/1; MG/1
1 TABLET ORAL EVERY 8 HOURS PRN
Qty: 90 TABLET | Refills: 0 | Status: SHIPPED | OUTPATIENT
Start: 2023-01-11 | End: 2023-02-10

## 2023-01-10 RX ORDER — NALOXONE HYDROCHLORIDE 4 MG/.1ML
SPRAY NASAL
Qty: 1 EACH | Refills: 0 | Status: SHIPPED | OUTPATIENT
Start: 2023-01-10

## 2023-01-10 ASSESSMENT — PATIENT HEALTH QUESTIONNAIRE - PHQ9
CLINICAL INTERPRETATION OF PHQ2 SCORE: 4
SUM OF ALL RESPONSES TO PHQ QUESTIONS 1-9: 13
5. POOR APPETITE OR OVEREATING: 2 - MORE THAN HALF THE DAYS

## 2023-01-10 ASSESSMENT — FIBROSIS 4 INDEX: FIB4 SCORE: 0.68

## 2023-01-10 ASSESSMENT — PAIN SCALES - GENERAL: PAINLEVEL: 7=MODERATE-SEVERE PAIN

## 2023-01-10 NOTE — PROGRESS NOTES
New patient note    Interventional spine and Pain  Physiatry (physical medicine and  Rehabilitation)     Date of service: See epic    Chief complaint:   Chief Complaint   Patient presents with    New Patient     Back pain        Referring provider: Jairo Sanchez P.A.*     HISTORY    HPI: Jamil Helm 57 y.o.  who presents today with Diagnoses of Lumbar radiculopathy, Chronic bilateral low back pain with bilateral sciatica, Bilateral chronic knee pain, Arthritis of knee, PTSD (post-traumatic stress disorder). he follows up with psychology and psychiatry. , and Depression with anxiety. follows up with psychology and psychiatry. were pertinent to this visit.    HPI    Chronic bilateral low back pain which radiates down the posterior aspect of the bilateral buttocks.  This is been present for many years.  Also with radiation down the right leg down the posterior lateral aspect of the left leg towards the dorsal aspect of the foot including the first and second webspace.  Aching in quality.  7 out of 10 intensity.  Constant.    He uses the norco 10/325 which he uses three times daily. He rarely uses the prescribed tylenol#4.     The patient had a history of spine surgery 2020 in [place] with no improvement.     He has followed up with Dr Leonidas Key for the past three years.  We do not have the notes to review from this physician at today's visit.    Medications tried in the past include Toradol, Percocet, tramadol, flexeril, Zanaflex, Robaxin, Norco, gabapentin, Tylenol No. 4.     Medical records review:  I reviewed the note from the referring provider Jairo Sanchez P.A.* including the note dated 12/19/2022.           ROS:   Red Flags ROS:   Fever, Chills, Sweats: Denies  Involuntary Weight Loss: Denies  Bladder Incontinence: Denies  Bowel Incontinence: denies  Saddle Anesthesia: Denies    All other systems reviewed and negative.       PMHx:   Past Medical History:   Diagnosis Date    Hyperlipidemia      Hypertension          Current Outpatient Medications on File Prior to Visit   Medication Sig Dispense Refill    atorvastatin (LIPITOR) 80 MG tablet Take  by mouth.      buPROPion (WELLBUTRIN XL) 300 MG XL tablet Take 300 mg by mouth.      Acetaminophen-Codeine 300-30 MG Tab acetaminophen 300 mg-codeine 30 mg tablet   take 1 tablet by mouth daily if needed for pain      atorvastatin (LIPITOR) 20 MG Tab atorvastatin 20 mg tablet   take 1 tablet by mouth once daily      buPROPion (WELLBUTRIN XL) 300 MG XL tablet bupropion HCl  mg 24 hr tablet, extended release   take 1 tablet by mouth once daily      citalopram (CELEXA) 40 MG Tab       enalapril-hydrochlorthiazide (VASERETIC) 10-25 MG per tablet Take 1 Tablet by mouth every day.      ibuprofen (MOTRIN) 200 MG Tab Take 4 Tablets by mouth.      pantoprazole (PROTONIX) 40 MG Tablet Delayed Response pantoprazole 40 mg tablet,delayed release   take 1 tablet by mouth once daily       No current facility-administered medications on file prior to visit.        PSHx:   Past Surgical History:   Procedure Laterality Date    CERVICAL DISK AND FUSION ANTERIOR      ELBOW ARTHROSCOPY Right     FOOT SURGERY Right        Family history   Family History   Problem Relation Age of Onset    Cancer Father         carcinoid syndrome of liver         Medications: reviewed on epic.   Outpatient Medications Marked as Taking for the 1/10/23 encounter (Office Visit) with Rebel Baird M.D.   Medication Sig Dispense Refill    HYDROcodone/acetaminophen (NORCO)  MG Tab Take 1 Tablet by mouth every 8 hours as needed for Moderate Pain or Severe Pain for up to 30 days. 90 Tablet 0    [START ON 1/11/2023] acetaminophen-codeine (TYLENOL/CODEINE #4) 300-60 MG per tablet Take 1 Tablet by mouth every 8 hours as needed (pain) for up to 30 days. 90 Tablet 0    Naloxone (NARCAN) 4 MG/0.1ML Liquid One spray in one nostril for overdose and call 911. 1 Each 0    atorvastatin (LIPITOR) 80 MG  tablet Take  by mouth.      buPROPion (WELLBUTRIN XL) 300 MG XL tablet Take 300 mg by mouth.      Acetaminophen-Codeine 300-30 MG Tab acetaminophen 300 mg-codeine 30 mg tablet   take 1 tablet by mouth daily if needed for pain      atorvastatin (LIPITOR) 20 MG Tab atorvastatin 20 mg tablet   take 1 tablet by mouth once daily      buPROPion (WELLBUTRIN XL) 300 MG XL tablet bupropion HCl  mg 24 hr tablet, extended release   take 1 tablet by mouth once daily      citalopram (CELEXA) 40 MG Tab       enalapril-hydrochlorthiazide (VASERETIC) 10-25 MG per tablet Take 1 Tablet by mouth every day.      ibuprofen (MOTRIN) 200 MG Tab Take 4 Tablets by mouth.      pantoprazole (PROTONIX) 40 MG Tablet Delayed Response pantoprazole 40 mg tablet,delayed release   take 1 tablet by mouth once daily          Allergies:   No Known Allergies    Social Hx:   Social History     Socioeconomic History    Marital status:      Spouse name: Not on file    Number of children: Not on file    Years of education: Not on file    Highest education level: Master's degree (e.g., MA, MS, [place], MEd, MSW, GISEL)   Occupational History    Occupation: Sergeant Major     Employer: [employer]   Tobacco Use    Smoking status: Never    Smokeless tobacco: Never   Vaping Use    Vaping Use: Never used   Substance and Sexual Activity    Alcohol use: Never    Drug use: Never    Sexual activity: Not on file   Other Topics Concern    Not on file   Social History Narrative    Not on file     Social Determinants of Health     Financial Resource Strain: Low Risk     Difficulty of Paying Living Expenses: Not hard at all   Food Insecurity: No Food Insecurity    Worried About Running Out of Food in the Last Year: Never true    Ran Out of Food in the Last Year: Never true   Transportation Needs: No Transportation Needs    Lack of Transportation (Medical): No    Lack of Transportation (Non-Medical): No   Physical Activity: Insufficiently Active    Days of Exercise  "per Week: 4 days    Minutes of Exercise per Session: 20 min   Stress: Stress Concern Present    Feeling of Stress : Very much   Social Connections: Moderately Integrated    Frequency of Communication with Friends and Family: Once a week    Frequency of Social Gatherings with Friends and Family: Never    Attends Jehovah's witness Services: More than 4 times per year    Active Member of Clubs or Organizations: Yes    Attends Club or Organization Meetings: Never    Marital Status:    Intimate Partner Violence: Not on file   Housing Stability: Low Risk     Unable to Pay for Housing in the Last Year: No    Number of Places Lived in the Last Year: 2    Unstable Housing in the Last Year: No         EXAMINATION     Physical Exam:   Vitals: /70 (BP Location: Right arm, Patient Position: Sitting, BP Cuff Size: Adult)   Pulse 84   Temp 36.2 °C (97.2 °F) (Temporal)   Ht 1.854 m (6' 1\")   Wt 112 kg (247 lb 12.8 oz)   SpO2 98%     Constitutional:   Body Habitus: Body mass index is 32.69 kg/m².  Cooperation: Fully cooperates with exam  Appearance: Well-groomed, well-nourished, not disheveled     Eyes: No scleral icterus to suggest severe liver disease, no proptosis to suggest severe hyperthyroid    ENT -no obvious auditory deficits, no obvious tongue lesions, tongue midline, no facial droop     Skin -no rashes or lesions noted     Respiratory-  breathing comfortable on room air, no audible wheezing    Cardiovascular- capillary refills less than 2 seconds.     Psychiatric- alert and oriented ×3. Normal affect.     Gait - normal gait, no use of ambulatory device, nonantalgic.   Musculoskeletal and Neuro -       Thoracic/Lumbar Spine/Sacral Spine/Hips   Inspection: No evidence of atrophy in bilateral lower extremities throughout     ROM: decreased active range of motion with flexion, lateral flexion, and rotation bilaterally.   There is decreased active range of motion with lumbar extension with pain.    There is pain with " facet loading maneuver (extension rotation) with axial low back pain on the BILATERAL side(s)    Palpation:   No tenderness to palpation in midline at T1-T12 levels. No tenderness to palpation in the left and right of the midline T1-L5, NEGATIVE for tenderness to palpation to the para-midline region in the lower lumbar levels.  palpation over SI joint: negative bilaterally    palpation in hip or over the gluteus medius tendon insertion: negative bilaterally      Lumbar spine Special tests  Neuro tension  Straight leg test negative right, positive left    Slump test negative right, positive left      HIP  FAIR test negative bilaterally    Range of motion in the RIGHT hip is full  in flexion, extension, abduction, internal rotation, external rotation.  Range of motion in the LEFT hip is full  in flexion, extension, abduction, internal rotation, external rotation.        Key points for the international standards for neurological classification of spinal cord injury (ISNCSCI) to light touch.     Dermatome R L                                      L2 2 2   L3 2 2   L4 2 1   L5 2 1   S1 2 2   S2 2 2       Motor Exam Lower Extremities    ? Myotome R L   Hip flexion L2 5 5   Knee extension L3 5 5   Ankle dorsiflexion L4 5 5-   Toe extension L5 5 4   Ankle plantarflexion S1 5 5-         Reflexes  ?  R L                Patella  2+ 2+   Achilles   2+ 2+       Babinski sign negative bilaterally   Clonus of the ankle negative bilaterally       MEDICAL DECISION MAKING    Medical records review: see under HPI section.     DATA    Labs:   Lab Results   Component Value Date/Time    SODIUM 141 12/16/2022 06:49 AM    POTASSIUM 4.4 12/16/2022 06:49 AM    CHLORIDE 106 12/16/2022 06:49 AM    CO2 24 12/16/2022 06:49 AM    ANION 11.0 12/16/2022 06:49 AM    GLUCOSE 89 12/16/2022 06:49 AM    BUN 12 12/16/2022 06:49 AM    CREATININE 0.87 12/16/2022 06:49 AM    CALCIUM 9.3 12/16/2022 06:49 AM    ASTSGOT 12 12/16/2022 06:49 AM    ALTSGPT 19  12/16/2022 06:49 AM    TBILIRUBIN 0.2 12/16/2022 06:49 AM    ALBUMIN 4.1 12/16/2022 06:49 AM    TOTPROTEIN 6.6 12/16/2022 06:49 AM    GLOBULIN 2.5 12/16/2022 06:49 AM    AGRATIO 1.6 12/16/2022 06:49 AM   ]    No results found for: PROTHROMBTM, INR     Lab Results   Component Value Date/Time    WBC 9.6 12/16/2022 06:49 AM    RBC 5.01 12/16/2022 06:49 AM    HEMOGLOBIN 13.5 (L) 12/16/2022 06:49 AM    HEMATOCRIT 41.6 (L) 12/16/2022 06:49 AM    MCV 83.0 12/16/2022 06:49 AM    MCH 26.9 (L) 12/16/2022 06:49 AM    MCHC 32.5 (L) 12/16/2022 06:49 AM    MPV 9.7 12/16/2022 06:49 AM    NEUTSPOLYS 68.60 12/16/2022 06:49 AM    LYMPHOCYTES 21.40 (L) 12/16/2022 06:49 AM    MONOCYTES 6.60 12/16/2022 06:49 AM    EOSINOPHILS 2.20 12/16/2022 06:49 AM    BASOPHILS 0.90 12/16/2022 06:49 AM        Lab Results   Component Value Date/Time    HBA1C 6.1 (H) 12/16/2022 06:49 AM        Imaging:   I personally reviewed following images, these are my reads    No images were available for my interpretation at this time.  Patient did bring in a disc with images however were unable to open the images as the program on the disc was not working.      IMAGING radiology reads. I reviewed the following radiology reads                                             MRI left knee 3/31/2022  Impression  Mild medial femoral and minor patellar early osteoarthritic chondral degeneration  Likely moderate proximal tibiofibular osteoarthritic change  Small posterior intercondylar notch likely ganglion cyst  Otherwise unremarkable MRI  Dictated by Dr. Bill Caraballo    MRI right knee 3/31/2022  Severe patellar and mild medial femoral foci of osteoarthritic chondral degeneration  Mild medial meniscal degeneration  Otherwise unremarkable MRI           Dictated by Dr. Bill Caraballo                                                       Diagnosis   Visit Diagnoses     ICD-10-CM   1. Lumbar radiculopathy  M54.16   2. Chronic bilateral low back pain with bilateral sciatica   M54.42    M54.41    G89.29   3. Bilateral chronic knee pain  M25.561    M25.562    G89.29   4. Arthritis of knee  M17.10   5. PTSD (post-traumatic stress disorder). he follows up with psychology and psychiatry.   F43.10   6. Depression with anxiety. follows up with psychology and psychiatry.  F41.8           ASSESSMENT AND PLAN:  Jamil Helm 57 y.o. male      Jamil was seen today for new patient.    Diagnoses and all orders for this visit:    Lumbar radiculopathy  -     HYDROcodone/acetaminophen (NORCO)  MG Tab; Take 1 Tablet by mouth every 8 hours as needed for Moderate Pain or Severe Pain for up to 30 days.  -     acetaminophen-codeine (TYLENOL/CODEINE #4) 300-60 MG per tablet; Take 1 Tablet by mouth every 8 hours as needed (pain) for up to 30 days.  -     Pain Management Screen; Future  -     Consent for Opiate Prescription  -     Naloxone (NARCAN) 4 MG/0.1ML Liquid; One spray in one nostril for overdose and call 911.    Chronic bilateral low back pain with bilateral sciatica  -     HYDROcodone/acetaminophen (NORCO)  MG Tab; Take 1 Tablet by mouth every 8 hours as needed for Moderate Pain or Severe Pain for up to 30 days.  -     acetaminophen-codeine (TYLENOL/CODEINE #4) 300-60 MG per tablet; Take 1 Tablet by mouth every 8 hours as needed (pain) for up to 30 days.  -     Pain Management Screen; Future  -     Consent for Opiate Prescription  -     Naloxone (NARCAN) 4 MG/0.1ML Liquid; One spray in one nostril for overdose and call 911.    Bilateral chronic knee pain  -     HYDROcodone/acetaminophen (NORCO)  MG Tab; Take 1 Tablet by mouth every 8 hours as needed for Moderate Pain or Severe Pain for up to 30 days.  -     acetaminophen-codeine (TYLENOL/CODEINE #4) 300-60 MG per tablet; Take 1 Tablet by mouth every 8 hours as needed (pain) for up to 30 days.  -     Pain Management Screen; Future  -     Consent for Opiate Prescription  -     Naloxone (NARCAN) 4 MG/0.1ML Liquid; One spray in one  nostril for overdose and call 911.    Arthritis of knee  -     HYDROcodone/acetaminophen (NORCO)  MG Tab; Take 1 Tablet by mouth every 8 hours as needed for Moderate Pain or Severe Pain for up to 30 days.  -     acetaminophen-codeine (TYLENOL/CODEINE #4) 300-60 MG per tablet; Take 1 Tablet by mouth every 8 hours as needed (pain) for up to 30 days.  -     Pain Management Screen; Future  -     Consent for Opiate Prescription  -     Naloxone (NARCAN) 4 MG/0.1ML Liquid; One spray in one nostril for overdose and call 911.    PTSD (post-traumatic stress disorder). he follows up with psychology and psychiatry.   Comments:  he follows up with psychology and psychiatry. denies SI/HI  Orders:  -     Patient has been identified as having a positive depression screening. Appropriate orders and counseling have been given.    Depression with anxiety. follows up with psychology and psychiatry.  Comments:  he follows up with psychology and psychiatry. denies SI/HI  Orders:  -     Patient has been identified as having a positive depression screening. Appropriate orders and counseling have been given.        The patient states that he follows up monthly with his current physician. He also get a toradol injection monthly and he wants to continue this regimen.  We discussed that we do not currently have access for this in our clinic.  He is continuing to follow-up with Dr. Key in [place] but would prefer to find somebody more locally to reduce the driving burden.  He is getting improvement with epidural steroid injections approximately every 3 to 4 months.    We discussed that we are primarily in interventional clinic.  I had a discussion with the patient that we would not have the ability to see him every month.  I discussed with him that if there is a provider that can manage his Norco and Tylenol No. 4 as well as his monthly Toradol injections then we will be able to do his epidural steroid injections and other  interventional needs.     We discussed the importance of the home exercise program.    Diagnostic workup: I requested the outside MRI.  If an MRI of the lumbar spine has not been done recently then it would be reasonable to get a new MRI of the lumbar spine.    Medications:       I provided the patient with the above prescriptions As to avoid him going into withdrawals.  I reviewed the [place] .  I renewed his Tylenol No. 4, #60, per 30 days, no refills.  I also renewed his Norco 10, #90 per 30 days, no refills.    At this visit I did not have the outside records from the patient's pain provider to provide my full recommendation.  I discussed with the patient that he should immediately be trying to see a physician for management as this was only a prescription for the next 30 days and he would need to find a physician to manage this or to taper.  We discussed resources.    Last opioid risk scale: 1/10/2023   Last urine drug screen: 1/10/2023 given and pending  Last controlled opioid consent signed: 1/10/2023    reviewed: 1/10/2023   Naloxone prescribed: yes. Discussed when and how to use the antidote upon prescription.        Opioid Risk Score: 3    Interpretation of Opioid Risk Score   Score 0-3 = Low risk of abuse. Do UDS at least once per year.  Score 4-7 = Moderate risk of abuse. Do UDS 1-4 times per year.  Score 8+ = High risk of abuse. Refer to specialist.     I reviewed the     In prescribing controlled substances to this patient, I certify that I have obtained and reviewed the medical history of Jamil Helm. I have also made a good carmelina effort to obtain applicable records from other providers who have treated the patient and records did not demonstrate any increased risk of substance abuse that would prevent me from prescribing controlled substances.     I have conducted a physical exam and documented it. I have reviewed Mr. Helm’s prescription history as maintained by the [place]  Prescription Monitoring Program.     I have assessed the patient’s risk for abuse, dependency, and addiction using the validated Opioid Risk Tool available at https://www.mdcalc.com/qcaaeg-cbfw-kvuk-ort-narcotic-abuse.     Given the above, I believe the benefits of controlled substance therapy outweigh the risks. The reasons for prescribing controlled substances include non-narcotic, oral analgesic alternatives have been inadequate for pain control. Accordingly, I have discussed the risk and benefits, treatment plan, and alternative therapies with the patient.     Total time spent on the day of the encounter: 60 minutes.  This includes counseling, care coordination, medical records review.          Follow-up: As needed with me          Please note that this dictation was created using voice recognition software. I have made every reasonable attempt to correct obvious errors but there may be errors of grammar and content that I may have overlooked prior to finalization of this note.      Rebel Baird MD  Physical Medicine and Rehabilitation  Interventional Spine and Sports Physiatry  [facility] Jairo Sanchez P.A.-C.

## 2023-01-12 ENCOUNTER — HOSPITAL ENCOUNTER (OUTPATIENT)
Dept: RADIOLOGY | Facility: MEDICAL CENTER | Age: 58
End: 2023-01-12
Payer: OTHER GOVERNMENT

## 2023-05-08 ENCOUNTER — OFFICE VISIT (OUTPATIENT)
Dept: MEDICAL GROUP | Facility: PHYSICIAN GROUP | Age: 58
End: 2023-05-08
Payer: OTHER GOVERNMENT

## 2023-05-08 VITALS
BODY MASS INDEX: 33.13 KG/M2 | SYSTOLIC BLOOD PRESSURE: 124 MMHG | TEMPERATURE: 97.4 F | HEIGHT: 73 IN | DIASTOLIC BLOOD PRESSURE: 100 MMHG | OXYGEN SATURATION: 99 % | RESPIRATION RATE: 16 BRPM | HEART RATE: 75 BPM | WEIGHT: 250 LBS

## 2023-05-08 DIAGNOSIS — N52.9 ERECTILE DYSFUNCTION, UNSPECIFIED ERECTILE DYSFUNCTION TYPE: ICD-10-CM

## 2023-05-08 DIAGNOSIS — F90.0 ATTENTION DEFICIT HYPERACTIVITY DISORDER (ADHD), PREDOMINANTLY INATTENTIVE TYPE: ICD-10-CM

## 2023-05-08 DIAGNOSIS — M54.16 LUMBAR RADICULOPATHY: ICD-10-CM

## 2023-05-08 DIAGNOSIS — I10 PRIMARY HYPERTENSION: ICD-10-CM

## 2023-05-08 DIAGNOSIS — E78.2 MIXED HYPERLIPIDEMIA: ICD-10-CM

## 2023-05-08 PROCEDURE — 99214 OFFICE O/P EST MOD 30 MIN: CPT | Mod: 25 | Performed by: PHYSICIAN ASSISTANT

## 2023-05-08 PROCEDURE — 96372 THER/PROPH/DIAG INJ SC/IM: CPT | Performed by: PHYSICIAN ASSISTANT

## 2023-05-08 RX ORDER — KETOROLAC TROMETHAMINE 30 MG/ML
30 INJECTION, SOLUTION INTRAMUSCULAR; INTRAVENOUS ONCE
Status: COMPLETED | OUTPATIENT
Start: 2023-05-08 | End: 2023-05-08

## 2023-05-08 RX ORDER — DEXTROAMPHETAMINE SACCHARATE, AMPHETAMINE ASPARTATE MONOHYDRATE, DEXTROAMPHETAMINE SULFATE AND AMPHETAMINE SULFATE 5; 5; 5; 5 MG/1; MG/1; MG/1; MG/1
20 CAPSULE, EXTENDED RELEASE ORAL EVERY MORNING
Qty: 30 CAPSULE | Refills: 0 | Status: SHIPPED | OUTPATIENT
Start: 2023-05-08 | End: 2023-06-07

## 2023-05-08 RX ORDER — SILDENAFIL 100 MG/1
100 TABLET, FILM COATED ORAL PRN
Qty: 30 TABLET | Refills: 0 | Status: SHIPPED | OUTPATIENT
Start: 2023-05-08 | End: 2023-08-01 | Stop reason: SDUPTHER

## 2023-05-08 RX ORDER — ATORVASTATIN CALCIUM 80 MG/1
80 TABLET, FILM COATED ORAL DAILY
Qty: 90 TABLET | Refills: 3 | Status: SHIPPED | OUTPATIENT
Start: 2023-05-08 | End: 2023-10-31 | Stop reason: SDUPTHER

## 2023-05-08 RX ORDER — ENALAPRIL MALEATE AND HYDROCHLOROTHIAZIDE 10; 25 MG/1; MG/1
1 TABLET ORAL DAILY
Qty: 90 TABLET | Refills: 3 | Status: SHIPPED
Start: 2023-05-08 | End: 2023-10-31

## 2023-05-08 RX ORDER — HYDROCODONE BITARTRATE AND ACETAMINOPHEN 10; 325 MG/1; MG/1
1 TABLET ORAL EVERY 8 HOURS PRN
Qty: 90 TABLET | Refills: 0 | Status: SHIPPED | OUTPATIENT
Start: 2023-05-08 | End: 2023-06-07

## 2023-05-08 RX ORDER — SILDENAFIL 100 MG/1
TABLET, FILM COATED ORAL
COMMUNITY
Start: 2023-03-14 | End: 2023-05-08 | Stop reason: SDUPTHER

## 2023-05-08 RX ORDER — AMLODIPINE BESYLATE 5 MG/1
5 TABLET ORAL DAILY
Qty: 30 TABLET | Refills: 0 | Status: SHIPPED
Start: 2023-05-08 | End: 2023-10-31

## 2023-05-08 RX ORDER — TIZANIDINE HYDROCHLORIDE 4 MG/1
CAPSULE, GELATIN COATED ORAL
COMMUNITY
Start: 2023-02-20 | End: 2023-08-01 | Stop reason: SDUPTHER

## 2023-05-08 RX ADMIN — KETOROLAC TROMETHAMINE 30 MG: 30 INJECTION, SOLUTION INTRAMUSCULAR; INTRAVENOUS at 09:43

## 2023-05-08 RX ADMIN — KETOROLAC TROMETHAMINE 30 MG: 30 INJECTION, SOLUTION INTRAMUSCULAR; INTRAVENOUS at 09:42

## 2023-05-08 ASSESSMENT — FIBROSIS 4 INDEX: FIB4 SCORE: 0.68

## 2023-05-08 NOTE — PROGRESS NOTES
CC:  Chief Complaint   Patient presents with    Medication Refill     Atorvastatin, enalapril-hydrochlorthiazide, tizanidine, sildenafil citrate     Follow-Up     States being nauseous,loose stools,  does not have an appetite x6wk.     Back Pain     Requesting Toradol shot        HISTORY OF PRESENT ILLNESS: Patient is a 57 y.o. male established patient presenting with issues below  Pt having a flare of his chronic back pain. Saw Dr Baird and filled his pain meds. He is having a hard time and is out his pain meds for the past month.  He would like me to take over his pain management.   Pt has had loose stools and poor appetite.   Pt brought his past records in indicating he has been on Adderall XR 20mg for ADHD.   Patient's blood pressure has been elevated at home.  Currently on enalapril/hydrochlorothiazide.    Current Outpatient Medications   Medication Sig Dispense Refill    sildenafil citrate (VIAGRA) 100 MG tablet       tizanidine (ZANAFLEX) 4 MG capsule       Naloxone (NARCAN) 4 MG/0.1ML Liquid One spray in one nostril for overdose and call 911. 1 Each 0    atorvastatin (LIPITOR) 80 MG tablet Take  by mouth.      Acetaminophen-Codeine 300-30 MG Tab acetaminophen 300 mg-codeine 30 mg tablet   take 1 tablet by mouth daily if needed for pain      enalapril-hydrochlorthiazide (VASERETIC) 10-25 MG per tablet Take 1 Tablet by mouth every day.      ibuprofen (MOTRIN) 200 MG Tab Take 4 Tablets by mouth.      pantoprazole (PROTONIX) 40 MG Tablet Delayed Response pantoprazole 40 mg tablet,delayed release   take 1 tablet by mouth once daily      buPROPion (WELLBUTRIN XL) 300 MG XL tablet Take 300 mg by mouth. (Patient not taking: Reported on 5/8/2023)      atorvastatin (LIPITOR) 20 MG Tab atorvastatin 20 mg tablet   take 1 tablet by mouth once daily (Patient not taking: Reported on 5/8/2023)      buPROPion (WELLBUTRIN XL) 300 MG XL tablet bupropion HCl  mg 24 hr tablet, extended release   take 1 tablet by mouth  "once daily (Patient not taking: Reported on 5/8/2023)      citalopram (CELEXA) 40 MG Tab  (Patient not taking: Reported on 5/8/2023)       No current facility-administered medications for this visit.        ROS:     ROS    Exam:    BP (!) 124/100   Pulse 75   Temp 36.3 °C (97.4 °F) (Temporal)   Resp 16   Ht 1.854 m (6' 1\")   Wt 113 kg (250 lb)   SpO2 99%  Body mass index is 32.98 kg/m².    General:  appears uncomfortable 2/2 back pain  Head is grossly normal.  Neck: Supple.   Skin: Warm and dry. No obvious lesions  Neuro: Normal muscle tone. Gait normal. Alert and oriented.  Psych: Normal mood and affect      Please note that this dictation was created using voice recognition software. I have made every reasonable attempt to correct obvious errors, but I expect that there are errors of grammar and possibly content that I did not discover before finalizing the note.        Assessment/Plan:    1. Lumbar radiculopathy  I will take over his pain management.  PDMP checked.  Controlled substance agreement signed  - Controlled Substance Treatment Agreement  - HYDROcodone/acetaminophen (NORCO)  MG Tab; Take 1 Tablet by mouth every 8 hours as needed for Moderate Pain for up to 30 days.  Dispense: 90 Tablet; Refill: 0  - ketorolac (TORADOL) injection 30 mg  - ketorolac (TORADOL) injection 30 mg    2. Attention deficit hyperactivity disorder (ADHD), predominantly inattentive type  Since he brought his records with him today I will start him on treatment for ADHD.  He will notify me if there is any issues with filling the medication at his pharmacy.  - Controlled Substance Treatment Agreement  - amphetamine-dextroamphetamine (ADDERALL XR, 20MG,) 20 MG per XR capsule; Take 1 Capsule by mouth every morning for 30 days.  Dispense: 30 Capsule; Refill: 0    3. Primary hypertension  Blood pressure elevated.  I am going to add on amlodipine and we will recheck this in 1 month  - amLODIPine (NORVASC) 5 MG Tab; Take 1 Tablet " by mouth every day.  Dispense: 30 Tablet; Refill: 0  - enalapril-hydrochlorthiazide (VASERETIC) 10-25 MG per tablet; Take 1 Tablet by mouth every day.  Dispense: 90 Tablet; Refill: 3    4. Erectile dysfunction, unspecified erectile dysfunction type  Refill sent in  - sildenafil citrate (VIAGRA) 100 MG tablet; Take 1 Tablet by mouth as needed for Erectile Dysfunction.  Dispense: 30 Tablet; Refill: 0    5. Mixed hyperlipidemia  Refill sent in  - atorvastatin (LIPITOR) 80 MG tablet; Take 1 Tablet by mouth every day.  Dispense: 90 Tablet; Refill: 3

## 2023-08-01 ENCOUNTER — OFFICE VISIT (OUTPATIENT)
Dept: MEDICAL GROUP | Facility: PHYSICIAN GROUP | Age: 58
End: 2023-08-01
Payer: OTHER GOVERNMENT

## 2023-08-01 VITALS
HEIGHT: 73 IN | OXYGEN SATURATION: 99 % | TEMPERATURE: 97 F | WEIGHT: 238 LBS | RESPIRATION RATE: 16 BRPM | SYSTOLIC BLOOD PRESSURE: 128 MMHG | BODY MASS INDEX: 31.54 KG/M2 | HEART RATE: 60 BPM | DIASTOLIC BLOOD PRESSURE: 80 MMHG

## 2023-08-01 DIAGNOSIS — N52.9 ERECTILE DYSFUNCTION, UNSPECIFIED ERECTILE DYSFUNCTION TYPE: ICD-10-CM

## 2023-08-01 DIAGNOSIS — M54.16 LUMBAR RADICULOPATHY: ICD-10-CM

## 2023-08-01 DIAGNOSIS — E78.2 MIXED HYPERLIPIDEMIA: ICD-10-CM

## 2023-08-01 PROCEDURE — 3079F DIAST BP 80-89 MM HG: CPT | Performed by: PHYSICIAN ASSISTANT

## 2023-08-01 PROCEDURE — 3074F SYST BP LT 130 MM HG: CPT | Performed by: PHYSICIAN ASSISTANT

## 2023-08-01 PROCEDURE — 99214 OFFICE O/P EST MOD 30 MIN: CPT | Performed by: PHYSICIAN ASSISTANT

## 2023-08-01 RX ORDER — ZOSTER VACCINE RECOMBINANT, ADJUVANTED 50 MCG/0.5
KIT INTRAMUSCULAR
COMMUNITY
End: 2023-10-31

## 2023-08-01 RX ORDER — ENALAPRIL MALEATE 5 MG/1
1 TABLET ORAL DAILY
COMMUNITY
End: 2023-10-31

## 2023-08-01 RX ORDER — BUSPIRONE HYDROCHLORIDE 5 MG/1
1 TABLET ORAL 3 TIMES DAILY PRN
COMMUNITY
End: 2023-10-31

## 2023-08-01 RX ORDER — LEVOFLOXACIN 750 MG/1
1 TABLET, FILM COATED ORAL DAILY
COMMUNITY
End: 2023-10-31

## 2023-08-01 RX ORDER — FLUTICASONE PROPIONATE 110 UG/1
AEROSOL, METERED RESPIRATORY (INHALATION)
COMMUNITY
End: 2023-10-31 | Stop reason: SDUPTHER

## 2023-08-01 RX ORDER — CYCLOBENZAPRINE HCL 10 MG
TABLET ORAL
COMMUNITY
End: 2023-10-31

## 2023-08-01 RX ORDER — TIZANIDINE HYDROCHLORIDE 4 MG/1
4 CAPSULE, GELATIN COATED ORAL
Qty: 90 CAPSULE | Refills: 1 | Status: SHIPPED | OUTPATIENT
Start: 2023-08-01 | End: 2024-01-02 | Stop reason: SDUPTHER

## 2023-08-01 RX ORDER — PANTOPRAZOLE SODIUM 40 MG/1
1 TABLET, DELAYED RELEASE ORAL DAILY
COMMUNITY
End: 2023-08-01

## 2023-08-01 RX ORDER — PROMETHAZINE HYDROCHLORIDE AND CODEINE PHOSPHATE 6.25; 1 MG/5ML; MG/5ML
SYRUP ORAL
COMMUNITY
End: 2023-08-01

## 2023-08-01 RX ORDER — OXYCODONE HYDROCHLORIDE AND ACETAMINOPHEN 5; 325 MG/1; MG/1
TABLET ORAL
COMMUNITY
End: 2023-08-01

## 2023-08-01 RX ORDER — BENZONATATE 200 MG/1
CAPSULE ORAL
COMMUNITY
End: 2023-10-31

## 2023-08-01 RX ORDER — SILDENAFIL 50 MG/1
1 TABLET, FILM COATED ORAL
COMMUNITY
End: 2023-08-01

## 2023-08-01 RX ORDER — ATORVASTATIN CALCIUM 20 MG/1
1 TABLET, FILM COATED ORAL DAILY
COMMUNITY
End: 2023-08-01

## 2023-08-01 RX ORDER — GABAPENTIN 300 MG/1
1 CAPSULE ORAL 3 TIMES DAILY
COMMUNITY
End: 2023-10-31

## 2023-08-01 RX ORDER — AMOXICILLIN AND CLAVULANATE POTASSIUM 875; 125 MG/1; MG/1
TABLET, FILM COATED ORAL
COMMUNITY
End: 2023-10-31

## 2023-08-01 RX ORDER — HYDROCODONE BITARTRATE AND ACETAMINOPHEN 5; 325 MG/1; MG/1
TABLET ORAL
COMMUNITY
End: 2023-08-01

## 2023-08-01 RX ORDER — HYDROCODONE BITARTRATE AND ACETAMINOPHEN 10; 325 MG/1; MG/1
1 TABLET ORAL EVERY 8 HOURS PRN
Qty: 90 TABLET | Refills: 0 | Status: SHIPPED | OUTPATIENT
Start: 2023-09-29 | End: 2023-10-29

## 2023-08-01 RX ORDER — HYDROCODONE BITARTRATE AND ACETAMINOPHEN 10; 325 MG/1; MG/1
1 TABLET ORAL EVERY 8 HOURS PRN
Qty: 90 TABLET | Refills: 0 | Status: SHIPPED | OUTPATIENT
Start: 2023-08-01 | End: 2023-08-31

## 2023-08-01 RX ORDER — TRAMADOL HYDROCHLORIDE 50 MG/1
1 TABLET ORAL EVERY 12 HOURS PRN
COMMUNITY
End: 2023-08-01

## 2023-08-01 RX ORDER — SILDENAFIL 100 MG/1
100 TABLET, FILM COATED ORAL PRN
Qty: 30 TABLET | Refills: 0 | Status: SHIPPED | OUTPATIENT
Start: 2023-08-01 | End: 2023-12-04

## 2023-08-01 RX ORDER — MELOXICAM 15 MG/1
1 TABLET ORAL DAILY
COMMUNITY
End: 2023-10-31

## 2023-08-01 RX ORDER — CEPHALEXIN 500 MG/1
1 CAPSULE ORAL 4 TIMES DAILY
COMMUNITY
End: 2023-10-31

## 2023-08-01 RX ORDER — HYDROCODONE BITARTRATE AND ACETAMINOPHEN 10; 325 MG/1; MG/1
1 TABLET ORAL EVERY 8 HOURS PRN
Qty: 90 TABLET | Refills: 0 | Status: SHIPPED | OUTPATIENT
Start: 2023-08-31 | End: 2023-09-30

## 2023-08-01 RX ORDER — CYCLOBENZAPRINE HCL 5 MG
TABLET ORAL
COMMUNITY
End: 2023-10-31

## 2023-08-01 RX ORDER — HYDROMORPHONE HYDROCHLORIDE 4 MG/1
TABLET ORAL
COMMUNITY
End: 2023-08-01

## 2023-08-01 RX ORDER — ATORVASTATIN CALCIUM 40 MG/1
1 TABLET, FILM COATED ORAL DAILY
COMMUNITY
End: 2023-10-31

## 2023-08-01 RX ORDER — BUPROPION HYDROCHLORIDE 300 MG/1
1 TABLET ORAL DAILY
COMMUNITY
End: 2023-08-01

## 2023-08-01 ASSESSMENT — FIBROSIS 4 INDEX: FIB4 SCORE: 0.69

## 2023-08-01 NOTE — PROGRESS NOTES
CC:  Chief Complaint   Patient presents with    Medication Refill     NORCO, sildenafil citrate 100mg, tizanidine          HISTORY OF PRESENT ILLNESS: Patient is a 58 y.o. male established patient presenting with issues below  Pt due for medication refill of his pain meds. Has not seen Dr Baird in a while because of insurance issues.   Pt has been taking adderall XR for his ADHD and taking some online classes. Notes that it was pretty expensive so will wait until [facility].   Pt needing refill of his viagra for ED.    Current Outpatient Medications   Medication Sig Dispense Refill    cyclobenzaprine (FLEXERIL) 10 mg Tab take 1 tablet by mouth three times a day if needed for SPASM      HYDROcodone-acetaminophen (NORCO) 5-325 MG Tab per tablet Take 1 tablet every 6 hours by oral route as needed.      tizanidine (ZANAFLEX) 4 MG capsule       enalapril-hydrochlorthiazide (VASERETIC) 10-25 MG per tablet Take 1 Tablet by mouth every day. 90 Tablet 3    sildenafil citrate (VIAGRA) 100 MG tablet Take 1 Tablet by mouth as needed for Erectile Dysfunction. 30 Tablet 0    Naloxone (NARCAN) 4 MG/0.1ML Liquid One spray in one nostril for overdose and call 911. 1 Each 0    atorvastatin (LIPITOR) 20 MG Tab       buPROPion (WELLBUTRIN XL) 300 MG XL tablet       ibuprofen (MOTRIN) 200 MG Tab Take 4 Tablets by mouth.      pantoprazole (PROTONIX) 40 MG Tablet Delayed Response pantoprazole 40 mg tablet,delayed release   take 1 tablet by mouth once daily      amoxicillin-clavulanate (AUGMENTIN) 875-125 MG Tab take 1 tablet by mouth every 12 hours for 7 days (Patient not taking: Reported on 8/1/2023)      atorvastatin (LIPITOR) 20 MG Tab Take 1 Tablet by mouth every day. (Patient not taking: Reported on 8/1/2023)      atorvastatin (LIPITOR) 40 MG Tab Take 1 Tablet by mouth every day. (Patient not taking: Reported on 8/1/2023)      benzonatate (TESSALON) 200 MG capsule take 1 capsule by mouth every 8 hours if needed  cough (Patient not taking: Reported on 8/1/2023)      busPIRone (BUSPAR) 5 MG tablet Take 1 Tablet by mouth 3 times a day as needed. (Patient not taking: Reported on 8/1/2023)      cephALEXin (KEFLEX) 500 MG Cap Take 1 Capsule by mouth 4 times a day. (Patient not taking: Reported on 8/1/2023)      cyclobenzaprine (FLEXERIL) 5 mg tablet take 1 tablet by mouth every 6 hours if needed for POST-OP SPASMS (Patient not taking: Reported on 8/1/2023)      enalapril (VASOTEC) 5 MG Tab Take 1 Tablet by mouth every day. (Patient not taking: Reported on 8/1/2023)      fluticasone (FLOVENT HFA) 110 MCG/ACT Aerosol inhale 2 puffs by mouth and INTO THE LUNGS twice a day (Patient not taking: Reported on 8/1/2023)      gabapentin (NEURONTIN) 300 MG Cap Take 1 Capsule by mouth 3 times a day. (Patient not taking: Reported on 8/1/2023)      HYDROmorphone (DILAUDID) 4 MG Tab  (Patient not taking: Reported on 8/1/2023)      Influenza Vac Subunit Quad (FLUCELVAX) 0.5 ML Suspension Prefilled Syringe injection  (Patient not taking: Reported on 8/1/2023)      levoFLOXacin (LEVAQUIN) 750 MG tablet Take 1 Tablet by mouth every day. (Patient not taking: Reported on 8/1/2023)      meloxicam (MOBIC) 15 MG tablet Take 1 Tablet by mouth every day. (Patient not taking: Reported on 8/1/2023)      oxyCODONE-acetaminophen (PERCOCET) 5-325 MG Tab take 1 tablet by mouth every 6 hours if needed for pain (Patient not taking: Reported on 8/1/2023)      promethazine-codeine (PHENERGAN-CODEINE) 6.25-10 MG/5ML Syrup TAKE 5 MILLILITERS BY MOUTH EVERY 4 HOURS IF NEEDED FOR COUGH (Patient not taking: Reported on 8/1/2023)      sildenafil citrate (VIAGRA) 50 MG tablet Take 1 Tablet by mouth 1 time a day as needed. (Patient not taking: Reported on 8/1/2023)      Zoster Vac Recomb Adjuvanted (SHINGRIX) 50 MCG/0.5ML Recon Susp  (Patient not taking: Reported on 8/1/2023)      traMADol (ULTRAM) 50 MG Tab Take 1 Tablet by mouth every 12 hours as needed. (Patient not  "taking: Reported on 8/1/2023)      amLODIPine (NORVASC) 5 MG Tab Take 1 Tablet by mouth every day. (Patient not taking: Reported on 8/1/2023) 30 Tablet 0    atorvastatin (LIPITOR) 80 MG tablet Take 1 Tablet by mouth every day. (Patient not taking: Reported on 8/1/2023) 90 Tablet 3    Acetaminophen-Codeine 300-30 MG Tab acetaminophen 300 mg-codeine 30 mg tablet   take 1 tablet by mouth daily if needed for pain (Patient not taking: Reported on 8/1/2023)      citalopram (CELEXA) 40 MG Tab  (Patient not taking: Reported on 5/8/2023)       No current facility-administered medications for this visit.        ROS:     ROS    Exam:    /80   Pulse 60   Temp 36.1 °C (97 °F) (Temporal)   Resp 16   Ht 1.854 m (6' 1\")   Wt 108 kg (238 lb)   SpO2 99%  Body mass index is 31.4 kg/m².    General:  Well nourished, well developed male in NAD  Head is grossly normal.  Neck: Supple.   Pulmonary: Clear to auscultation. No ronchi, wheezing or rales  Cardiac: Regular rate and rhythm. No murmurs.  Skin: Warm and dry. No obvious lesions  Neuro: Normal muscle tone. Gait normal. Alert and oriented.  Psych: Normal mood and affect      Please note that this dictation was created using voice recognition software. I have made every reasonable attempt to correct obvious errors, but I expect that there are errors of grammar and possibly content that I did not discover before finalizing the note.        Assessment/Plan:    1. Mixed hyperlipidemia  Check on lipids.   - Lipid Profile; Future    2. Lumbar radiculopathy  PDMP checked. Refills sent in.   - tizanidine (ZANAFLEX) 4 MG capsule; Take 1 Capsule by mouth 1 time a day as needed (pain).  Dispense: 90 Capsule; Refill: 1  - HYDROcodone/acetaminophen (NORCO)  MG Tab; Take 1 Tablet by mouth every 8 hours as needed for Severe Pain for up to 30 days.  Dispense: 90 Tablet; Refill: 0  - HYDROcodone/acetaminophen (NORCO)  MG Tab; Take 1 Tablet by mouth every 8 hours as needed for " Severe Pain for up to 30 days.  Dispense: 90 Tablet; Refill: 0  - HYDROcodone/acetaminophen (NORCO)  MG Tab; Take 1 Tablet by mouth every 8 hours as needed for Severe Pain for up to 30 days.  Dispense: 90 Tablet; Refill: 0    3. Erectile dysfunction, unspecified erectile dysfunction type  Refills sent in  - sildenafil citrate (VIAGRA) 100 MG tablet; Take 1 Tablet by mouth as needed for Erectile Dysfunction.  Dispense: 30 Tablet; Refill: 0

## 2023-08-30 DIAGNOSIS — M54.16 LUMBAR RADICULOPATHY: ICD-10-CM

## 2023-08-30 RX ORDER — HYDROCODONE BITARTRATE AND ACETAMINOPHEN 10; 325 MG/1; MG/1
1 TABLET ORAL EVERY 8 HOURS PRN
Qty: 90 TABLET | Refills: 0 | Status: CANCELLED | OUTPATIENT
Start: 2023-08-30 | End: 2023-09-29

## 2023-10-05 ENCOUNTER — TELEPHONE (OUTPATIENT)
Dept: MEDICAL GROUP | Facility: PHYSICIAN GROUP | Age: 58
End: 2023-10-05
Payer: OTHER GOVERNMENT

## 2023-10-05 NOTE — TELEPHONE ENCOUNTER
Spoke with patient informing him he has one more refill on the pharmacy from 9/29 that is available for him to pick. Patient states he will call the pharmacy.

## 2023-10-30 ENCOUNTER — HOSPITAL ENCOUNTER (OUTPATIENT)
Dept: LAB | Facility: MEDICAL CENTER | Age: 58
End: 2023-10-30
Attending: PHYSICIAN ASSISTANT
Payer: OTHER GOVERNMENT

## 2023-10-30 DIAGNOSIS — E78.2 MIXED HYPERLIPIDEMIA: ICD-10-CM

## 2023-10-30 LAB
CHOLEST SERPL-MCNC: 198 MG/DL (ref 100–199)
FASTING STATUS PATIENT QL REPORTED: NORMAL
HDLC SERPL-MCNC: 39 MG/DL
LDLC SERPL CALC-MCNC: 128 MG/DL
TRIGL SERPL-MCNC: 153 MG/DL (ref 0–149)

## 2023-10-30 PROCEDURE — 80061 LIPID PANEL: CPT

## 2023-10-30 PROCEDURE — 36415 COLL VENOUS BLD VENIPUNCTURE: CPT

## 2023-10-31 ENCOUNTER — OFFICE VISIT (OUTPATIENT)
Dept: MEDICAL GROUP | Facility: PHYSICIAN GROUP | Age: 58
End: 2023-10-31
Payer: OTHER GOVERNMENT

## 2023-10-31 VITALS
HEART RATE: 97 BPM | BODY MASS INDEX: 30.75 KG/M2 | SYSTOLIC BLOOD PRESSURE: 134 MMHG | RESPIRATION RATE: 16 BRPM | TEMPERATURE: 97.2 F | DIASTOLIC BLOOD PRESSURE: 98 MMHG | HEIGHT: 73 IN | WEIGHT: 232 LBS | OXYGEN SATURATION: 99 %

## 2023-10-31 DIAGNOSIS — M54.16 LUMBAR RADICULOPATHY: ICD-10-CM

## 2023-10-31 DIAGNOSIS — I10 PRIMARY HYPERTENSION: ICD-10-CM

## 2023-10-31 DIAGNOSIS — Z11.3 SCREEN FOR STD (SEXUALLY TRANSMITTED DISEASE): ICD-10-CM

## 2023-10-31 DIAGNOSIS — Z23 NEED FOR VACCINATION: ICD-10-CM

## 2023-10-31 DIAGNOSIS — Z11.59 ENCOUNTER FOR HEPATITIS C SCREENING TEST FOR LOW RISK PATIENT: ICD-10-CM

## 2023-10-31 DIAGNOSIS — F51.01 PRIMARY INSOMNIA: ICD-10-CM

## 2023-10-31 DIAGNOSIS — Z00.00 PHYSICAL EXAM, ANNUAL: ICD-10-CM

## 2023-10-31 DIAGNOSIS — E78.2 MIXED HYPERLIPIDEMIA: ICD-10-CM

## 2023-10-31 PROCEDURE — 99214 OFFICE O/P EST MOD 30 MIN: CPT | Mod: 25 | Performed by: PHYSICIAN ASSISTANT

## 2023-10-31 PROCEDURE — 90686 IIV4 VACC NO PRSV 0.5 ML IM: CPT | Performed by: PHYSICIAN ASSISTANT

## 2023-10-31 PROCEDURE — 3075F SYST BP GE 130 - 139MM HG: CPT | Performed by: PHYSICIAN ASSISTANT

## 2023-10-31 PROCEDURE — 90471 IMMUNIZATION ADMIN: CPT | Performed by: PHYSICIAN ASSISTANT

## 2023-10-31 PROCEDURE — 3080F DIAST BP >= 90 MM HG: CPT | Performed by: PHYSICIAN ASSISTANT

## 2023-10-31 RX ORDER — HYDROCODONE BITARTRATE AND ACETAMINOPHEN 10; 325 MG/1; MG/1
1 TABLET ORAL EVERY 8 HOURS PRN
Qty: 90 TABLET | Refills: 0 | Status: SHIPPED | OUTPATIENT
Start: 2023-12-30 | End: 2024-01-29

## 2023-10-31 RX ORDER — ATORVASTATIN CALCIUM 80 MG/1
80 TABLET, FILM COATED ORAL DAILY
Qty: 90 TABLET | Refills: 3 | Status: SHIPPED | OUTPATIENT
Start: 2023-10-31

## 2023-10-31 RX ORDER — PANTOPRAZOLE SODIUM 40 MG/1
40 TABLET, DELAYED RELEASE ORAL DAILY
Qty: 90 TABLET | Refills: 3 | Status: SHIPPED | OUTPATIENT
Start: 2023-10-31

## 2023-10-31 RX ORDER — ACETAMINOPHEN AND CODEINE PHOSPHATE 300; 60 MG/1; MG/1
1 TABLET ORAL
COMMUNITY
End: 2023-10-31

## 2023-10-31 RX ORDER — ENALAPRIL MALEATE 20 MG/1
20 TABLET ORAL DAILY
Qty: 90 TABLET | Refills: 0 | Status: SHIPPED | OUTPATIENT
Start: 2023-10-31 | End: 2024-02-08

## 2023-10-31 RX ORDER — BUPROPION HYDROCHLORIDE 300 MG/1
300 TABLET ORAL EVERY MORNING
Qty: 90 TABLET | Refills: 3 | Status: SHIPPED | OUTPATIENT
Start: 2023-10-31

## 2023-10-31 RX ORDER — ACETAMINOPHEN AND CODEINE PHOSPHATE 300; 30 MG/1; MG/1
1 TABLET ORAL
COMMUNITY
End: 2024-02-07

## 2023-10-31 RX ORDER — TRAZODONE HYDROCHLORIDE 50 MG/1
50 TABLET ORAL
Qty: 90 TABLET | Refills: 0 | Status: SHIPPED | OUTPATIENT
Start: 2023-10-31 | End: 2024-01-30

## 2023-10-31 RX ORDER — HYDROCODONE BITARTRATE AND ACETAMINOPHEN 10; 325 MG/1; MG/1
1 TABLET ORAL EVERY 8 HOURS PRN
Qty: 90 TABLET | Refills: 0 | Status: SHIPPED | OUTPATIENT
Start: 2023-10-31 | End: 2023-11-30

## 2023-10-31 RX ORDER — ENALAPRIL MALEATE 10 MG/1
10 TABLET ORAL DAILY
COMMUNITY
End: 2023-10-31

## 2023-10-31 RX ORDER — HYDROCODONE BITARTRATE AND ACETAMINOPHEN 10; 325 MG/1; MG/1
1 TABLET ORAL EVERY 8 HOURS PRN
Qty: 90 TABLET | Refills: 0 | Status: SHIPPED | OUTPATIENT
Start: 2023-11-30 | End: 2023-12-30

## 2023-10-31 RX ORDER — FLUTICASONE PROPIONATE 110 UG/1
1 AEROSOL, METERED RESPIRATORY (INHALATION) 2 TIMES DAILY
Qty: 3 EACH | Refills: 3 | Status: SHIPPED | OUTPATIENT
Start: 2023-10-31

## 2023-10-31 ASSESSMENT — FIBROSIS 4 INDEX: FIB4 SCORE: 0.69

## 2023-10-31 NOTE — PROGRESS NOTES
"CC:  Chief Complaint   Patient presents with    Medication Refill     Atorvastatin, buPROPion, enalapril, FLOVENT HFA, pantoprazole, sildenafil citrate     Lab Results       HISTORY OF PRESENT ILLNESS: Patient is a 58 y.o. male established patient presenting with issues below  Pt needing medication refills. Has been taking norco with good effect for chronic back pain.   Pt's lipids much improved with lipitor 80mg    Current Outpatient Medications   Medication Sig Dispense Refill    acetaminophen-codeine #3 (TYLENOL #3) 300-30 MG Tab Take 1 Tablet by mouth 1 time a day as needed.      enalapril (VASOTEC) 10 MG Tab Take 10 mg by mouth every day.      fluticasone (FLOVENT HFA) 110 MCG/ACT Aerosol       tizanidine (ZANAFLEX) 4 MG capsule Take 1 Capsule by mouth 1 time a day as needed (pain). 90 Capsule 1    sildenafil citrate (VIAGRA) 100 MG tablet Take 1 Tablet by mouth as needed for Erectile Dysfunction. 30 Tablet 0    atorvastatin (LIPITOR) 80 MG tablet Take 1 Tablet by mouth every day. 90 Tablet 3    Naloxone (NARCAN) 4 MG/0.1ML Liquid One spray in one nostril for overdose and call 911. 1 Each 0    buPROPion (WELLBUTRIN XL) 300 MG XL tablet       ibuprofen (MOTRIN) 200 MG Tab Take 4 Tablets by mouth.      pantoprazole (PROTONIX) 40 MG Tablet Delayed Response pantoprazole 40 mg tablet,delayed release   take 1 tablet by mouth once daily       No current facility-administered medications for this visit.        ROS:     ROS    Exam:    BP (!) 134/98   Pulse 97   Temp 36.2 °C (97.2 °F) (Temporal)   Resp 16   Ht 1.854 m (6' 1\")   Wt 105 kg (232 lb)   SpO2 99%  Body mass index is 30.61 kg/m².    General:  Well nourished, well developed male in NAD  Head is grossly normal.  Neck: Supple.   Pulmonary: Clear to auscultation. No ronchi, wheezing or rales  Cardiac: Regular rate and rhythm. No murmurs.  Skin: Warm and dry. No obvious lesions  Neuro: Normal muscle tone. Gait normal. Alert and oriented.  Psych: Normal mood " and affect      Please note that this dictation was created using voice recognition software. I have made every reasonable attempt to correct obvious errors, but I expect that there are errors of grammar and possibly content that I did not discover before finalizing the note.        Assessment/Plan:    1. Need for vaccination    - INFLUENZA VACCINE QUAD INJ (PF)    2. Physical exam, annual  Labs printed  - CBC WITH DIFFERENTIAL; Future  - Comp Metabolic Panel; Future  - HEMOGLOBIN A1C; Future  - TSH WITH REFLEX TO FT4; Future    3. Encounter for hepatitis C screening test for low risk patient    - HEP C VIRUS ANTIBODY; Future    4. Screen for STD (sexually transmitted disease)    - HIV AG/AB COMBO ASSAY SCREENING; Future    5. Primary insomnia  Trial on trazodone.  - traZODone (DESYREL) 50 MG Tab; Take 1 Tablet by mouth at bedtime as needed for Sleep for up to 30 days.  Dispense: 90 Tablet; Refill: 0    6. Lumbar radiculopathy  PDMP checked.  Refill sent in  - HYDROcodone/acetaminophen (NORCO)  MG Tab; Take 1 Tablet by mouth every 8 hours as needed for Severe Pain for up to 30 days.  Dispense: 90 Tablet; Refill: 0  - HYDROcodone/acetaminophen (NORCO)  MG Tab; Take 1 Tablet by mouth every 8 hours as needed for Severe Pain for up to 30 days.  Dispense: 90 Tablet; Refill: 0  - HYDROcodone/acetaminophen (NORCO)  MG Tab; Take 1 Tablet by mouth every 8 hours as needed for Severe Pain for up to 30 days.  Dispense: 90 Tablet; Refill: 0    7. Primary hypertension  Increase enalapril to 20 mg.  - enalapril (VASOTEC) 20 MG tablet; Take 1 Tablet by mouth every day.  Dispense: 90 Tablet; Refill: 0    8. Mixed hyperlipidemia  Continue Lipitor 80 mg  - atorvastatin (LIPITOR) 80 MG tablet; Take 1 Tablet by mouth every day.  Dispense: 90 Tablet; Refill: 3

## 2023-12-02 DIAGNOSIS — N52.9 ERECTILE DYSFUNCTION, UNSPECIFIED ERECTILE DYSFUNCTION TYPE: ICD-10-CM

## 2023-12-04 RX ORDER — SILDENAFIL 100 MG/1
TABLET, FILM COATED ORAL
Qty: 30 TABLET | Refills: 0 | Status: SHIPPED | OUTPATIENT
Start: 2023-12-04

## 2024-01-02 DIAGNOSIS — M54.16 LUMBAR RADICULOPATHY: ICD-10-CM

## 2024-01-02 RX ORDER — TIZANIDINE HYDROCHLORIDE 4 MG/1
4 CAPSULE, GELATIN COATED ORAL
Qty: 90 CAPSULE | Refills: 1 | Status: SHIPPED | OUTPATIENT
Start: 2024-01-02

## 2024-01-29 DIAGNOSIS — F51.01 PRIMARY INSOMNIA: ICD-10-CM

## 2024-01-30 RX ORDER — TRAZODONE HYDROCHLORIDE 50 MG/1
TABLET ORAL
Qty: 90 TABLET | Refills: 0 | Status: SHIPPED | OUTPATIENT
Start: 2024-01-30

## 2024-01-30 NOTE — TELEPHONE ENCOUNTER
Received request via: Patient    Was the patient seen in the last year in this department? Yes    Does the patient have an active prescription (recently filled or refills available) for medication(s) requested? No    [facility]    Does the patient have half-way Plus and need 100 day supply (blood pressure, diabetes and cholesterol meds only)? Patient does not have SCP

## 2024-02-06 ENCOUNTER — HOSPITAL ENCOUNTER (OUTPATIENT)
Dept: LAB | Facility: MEDICAL CENTER | Age: 59
End: 2024-02-06
Attending: PHYSICIAN ASSISTANT
Payer: OTHER GOVERNMENT

## 2024-02-06 DIAGNOSIS — Z11.3 SCREEN FOR STD (SEXUALLY TRANSMITTED DISEASE): ICD-10-CM

## 2024-02-06 DIAGNOSIS — Z11.59 ENCOUNTER FOR HEPATITIS C SCREENING TEST FOR LOW RISK PATIENT: ICD-10-CM

## 2024-02-06 DIAGNOSIS — Z00.00 PHYSICAL EXAM, ANNUAL: ICD-10-CM

## 2024-02-06 LAB
ALBUMIN SERPL BCP-MCNC: 4.4 G/DL (ref 3.2–4.9)
ALBUMIN/GLOB SERPL: 1.7 G/DL
ALP SERPL-CCNC: 117 U/L (ref 30–99)
ALT SERPL-CCNC: 33 U/L (ref 2–50)
ANION GAP SERPL CALC-SCNC: 11 MMOL/L (ref 7–16)
AST SERPL-CCNC: 12 U/L (ref 12–45)
BASOPHILS # BLD AUTO: 0.8 % (ref 0–1.8)
BASOPHILS # BLD: 0.07 K/UL (ref 0–0.12)
BILIRUB SERPL-MCNC: 0.3 MG/DL (ref 0.1–1.5)
BUN SERPL-MCNC: 14 MG/DL (ref 8–22)
CALCIUM ALBUM COR SERPL-MCNC: 8.9 MG/DL (ref 8.5–10.5)
CALCIUM SERPL-MCNC: 9.2 MG/DL (ref 8.5–10.5)
CHLORIDE SERPL-SCNC: 104 MMOL/L (ref 96–112)
CO2 SERPL-SCNC: 26 MMOL/L (ref 20–33)
CREAT SERPL-MCNC: 0.95 MG/DL (ref 0.5–1.4)
EOSINOPHIL # BLD AUTO: 0.21 K/UL (ref 0–0.51)
EOSINOPHIL NFR BLD: 2.5 % (ref 0–6.9)
ERYTHROCYTE [DISTWIDTH] IN BLOOD BY AUTOMATED COUNT: 41.3 FL (ref 35.9–50)
EST. AVERAGE GLUCOSE BLD GHB EST-MCNC: 117 MG/DL
FASTING STATUS PATIENT QL REPORTED: NORMAL
GFR SERPLBLD CREATININE-BSD FMLA CKD-EPI: 93 ML/MIN/1.73 M 2
GLOBULIN SER CALC-MCNC: 2.6 G/DL (ref 1.9–3.5)
GLUCOSE SERPL-MCNC: 103 MG/DL (ref 65–99)
HBA1C MFR BLD: 5.7 % (ref 4–5.6)
HCT VFR BLD AUTO: 45.4 % (ref 42–52)
HCV AB SER QL: NORMAL
HGB BLD-MCNC: 15.1 G/DL (ref 14–18)
HIV 1+2 AB+HIV1 P24 AG SERPL QL IA: NORMAL
IMM GRANULOCYTES # BLD AUTO: 0.04 K/UL (ref 0–0.11)
IMM GRANULOCYTES NFR BLD AUTO: 0.5 % (ref 0–0.9)
LYMPHOCYTES # BLD AUTO: 1.91 K/UL (ref 1–4.8)
LYMPHOCYTES NFR BLD: 22.9 % (ref 22–41)
MCH RBC QN AUTO: 27.7 PG (ref 27–33)
MCHC RBC AUTO-ENTMCNC: 33.3 G/DL (ref 32.3–36.5)
MCV RBC AUTO: 83.2 FL (ref 81.4–97.8)
MONOCYTES # BLD AUTO: 0.51 K/UL (ref 0–0.85)
MONOCYTES NFR BLD AUTO: 6.1 % (ref 0–13.4)
NEUTROPHILS # BLD AUTO: 5.61 K/UL (ref 1.82–7.42)
NEUTROPHILS NFR BLD: 67.2 % (ref 44–72)
NRBC # BLD AUTO: 0 K/UL
NRBC BLD-RTO: 0 /100 WBC (ref 0–0.2)
PLATELET # BLD AUTO: 235 K/UL (ref 164–446)
PMV BLD AUTO: 9.7 FL (ref 9–12.9)
POTASSIUM SERPL-SCNC: 4.1 MMOL/L (ref 3.6–5.5)
PROT SERPL-MCNC: 7 G/DL (ref 6–8.2)
RBC # BLD AUTO: 5.46 M/UL (ref 4.7–6.1)
SODIUM SERPL-SCNC: 141 MMOL/L (ref 135–145)
TSH SERPL DL<=0.005 MIU/L-ACNC: 1.65 UIU/ML (ref 0.38–5.33)
WBC # BLD AUTO: 8.4 K/UL (ref 4.8–10.8)

## 2024-02-06 PROCEDURE — 87389 HIV-1 AG W/HIV-1&-2 AB AG IA: CPT

## 2024-02-06 PROCEDURE — 36415 COLL VENOUS BLD VENIPUNCTURE: CPT

## 2024-02-06 PROCEDURE — 84443 ASSAY THYROID STIM HORMONE: CPT

## 2024-02-06 PROCEDURE — 83036 HEMOGLOBIN GLYCOSYLATED A1C: CPT

## 2024-02-06 PROCEDURE — 86803 HEPATITIS C AB TEST: CPT

## 2024-02-06 PROCEDURE — 80053 COMPREHEN METABOLIC PANEL: CPT

## 2024-02-06 PROCEDURE — 85025 COMPLETE CBC W/AUTO DIFF WBC: CPT

## 2024-02-07 ENCOUNTER — OFFICE VISIT (OUTPATIENT)
Dept: MEDICAL GROUP | Facility: PHYSICIAN GROUP | Age: 59
End: 2024-02-07
Payer: OTHER GOVERNMENT

## 2024-02-07 ENCOUNTER — HOSPITAL ENCOUNTER (OUTPATIENT)
Facility: MEDICAL CENTER | Age: 59
End: 2024-02-07
Attending: PHYSICIAN ASSISTANT
Payer: OTHER GOVERNMENT

## 2024-02-07 VITALS
DIASTOLIC BLOOD PRESSURE: 64 MMHG | HEIGHT: 73 IN | RESPIRATION RATE: 16 BRPM | WEIGHT: 230.2 LBS | BODY MASS INDEX: 30.51 KG/M2 | SYSTOLIC BLOOD PRESSURE: 124 MMHG | TEMPERATURE: 96.9 F | HEART RATE: 65 BPM | OXYGEN SATURATION: 98 %

## 2024-02-07 DIAGNOSIS — R73.03 PREDIABETES: ICD-10-CM

## 2024-02-07 DIAGNOSIS — M54.16 LUMBAR RADICULOPATHY: ICD-10-CM

## 2024-02-07 DIAGNOSIS — I10 PRIMARY HYPERTENSION: ICD-10-CM

## 2024-02-07 PROCEDURE — G0480 DRUG TEST DEF 1-7 CLASSES: HCPCS

## 2024-02-07 PROCEDURE — 3078F DIAST BP <80 MM HG: CPT | Performed by: PHYSICIAN ASSISTANT

## 2024-02-07 PROCEDURE — 3074F SYST BP LT 130 MM HG: CPT | Performed by: PHYSICIAN ASSISTANT

## 2024-02-07 PROCEDURE — 99214 OFFICE O/P EST MOD 30 MIN: CPT | Performed by: PHYSICIAN ASSISTANT

## 2024-02-07 PROCEDURE — 80307 DRUG TEST PRSMV CHEM ANLYZR: CPT

## 2024-02-07 RX ORDER — HYDROCODONE BITARTRATE AND ACETAMINOPHEN 10; 325 MG/1; MG/1
1 TABLET ORAL EVERY 8 HOURS PRN
Qty: 90 TABLET | Refills: 0 | Status: SHIPPED | OUTPATIENT
Start: 2024-04-07 | End: 2024-05-07

## 2024-02-07 RX ORDER — HYDROCODONE BITARTRATE AND ACETAMINOPHEN 10; 325 MG/1; MG/1
1 TABLET ORAL EVERY 8 HOURS PRN
Qty: 90 TABLET | Refills: 0 | Status: SHIPPED | OUTPATIENT
Start: 2024-03-08 | End: 2024-04-07

## 2024-02-07 RX ORDER — KETOROLAC TROMETHAMINE 10 MG/1
10 TABLET, FILM COATED ORAL
COMMUNITY
Start: 2023-12-20

## 2024-02-07 RX ORDER — ONDANSETRON 4 MG/1
4 TABLET, ORALLY DISINTEGRATING ORAL
COMMUNITY
Start: 2023-12-20

## 2024-02-07 RX ORDER — HYDROCODONE BITARTRATE AND ACETAMINOPHEN 10; 325 MG/1; MG/1
1 TABLET ORAL EVERY 8 HOURS PRN
Qty: 90 TABLET | Refills: 0 | Status: SHIPPED | OUTPATIENT
Start: 2024-02-07 | End: 2024-03-08

## 2024-02-07 ASSESSMENT — PATIENT HEALTH QUESTIONNAIRE - PHQ9: CLINICAL INTERPRETATION OF PHQ2 SCORE: 0

## 2024-02-07 ASSESSMENT — FIBROSIS 4 INDEX: FIB4 SCORE: 0.51

## 2024-02-07 NOTE — PROGRESS NOTES
CC:  Chief Complaint   Patient presents with    Medication Refill     Hydrocodone        HISTORY OF PRESENT ILLNESS: Patient is a 58 y.o. male established patient presenting with issues below  Pt's BP improved since increasing his enalapril.   Patient's hemoglobin A1c is borderline high at 5.7% but it is improved since it was checked from last year.  Patient needing refill of his [place] today for his chronic lower back pain.    Current Outpatient Medications   Medication Sig Dispense Refill    docusate sodium (COLACE) 50 MG Cap Take 50 mg by mouth.      ketorolac (TORADOL) 10 MG Tab Take 10 mg by mouth.      ondansetron (ZOFRAN ODT) 4 MG TABLET DISPERSIBLE Take 4 mg by mouth.      traZODone (DESYREL) 50 MG Tab TAKE ONE TABLET BY MOUTH EVERY DAY AT BEDTIME AS NEEDED FOR SLEEP 90 Tablet 0    tizanidine (ZANAFLEX) 4 MG capsule Take 1 Capsule by mouth 1 time a day as needed (pain). 90 Capsule 1    sildenafil citrate (VIAGRA) 100 MG tablet TAKE ONE TABLET BY MOUTH EVERY DAY AS NEEDED FOR ERECTILE DYSFUNCTION 30 Tablet 0    enalapril (VASOTEC) 20 MG tablet Take 1 Tablet by mouth every day. 90 Tablet 0    atorvastatin (LIPITOR) 80 MG tablet Take 1 Tablet by mouth every day. 90 Tablet 3    buPROPion (WELLBUTRIN XL) 300 MG XL tablet Take 1 Tablet by mouth every morning. 90 Tablet 3    fluticasone (FLOVENT HFA) 110 MCG/ACT Aerosol Inhale 1 Puff 2 times a day. 3 Each 3    pantoprazole (PROTONIX) 40 MG Tablet Delayed Response Take 1 Tablet by mouth every day. 90 Tablet 3    Naloxone (NARCAN) 4 MG/0.1ML Liquid One spray in one nostril for overdose and call 911. 1 Each 0    ibuprofen (MOTRIN) 200 MG Tab Take 4 Tablets by mouth.      acetaminophen-codeine #3 (TYLENOL #3) 300-30 MG Tab Take 1 Tablet by mouth 1 time a day as needed.       No current facility-administered medications for this visit.        ROS:     ROS    Exam:    /64 (BP Location: Left arm, Patient Position: Sitting, BP Cuff Size: Adult)   Pulse 65   Temp  "36.1 °C (96.9 °F) (Temporal)   Resp 16   Ht 1.854 m (6' 1\")   Wt 104 kg (230 lb 3.2 oz)   SpO2 98%  Body mass index is 30.37 kg/m².    General:  Well nourished, well developed male in NAD  Head is grossly normal.  Neck: Supple.   Pulmonary: Clear to auscultation. No ronchi, wheezing or rales  Cardiac: Regular rate and rhythm. No murmurs.  Skin: Warm and dry. No obvious lesions  Neuro: Normal muscle tone. Gait normal. Alert and oriented.  Psych: Normal mood and affect      Please note that this dictation was created using voice recognition software. I have made every reasonable attempt to correct obvious errors, but I expect that there are errors of grammar and possibly content that I did not discover before finalizing the note.        Assessment/Plan:  1. Lumbar radiculopathy  PDMP checked.  Refill sent in.  Continue Norco 10/325 mg  - PAIN MANAGEMENT SCRN, W/ RFLX TO QNT; Future  - Controlled Substance Treatment Agreement  - HYDROcodone/acetaminophen (NORCO)  MG Tab; Take 1 Tablet by mouth every 8 hours as needed for Moderate Pain (back pain) for up to 30 days.  Dispense: 90 Tablet; Refill: 0  - HYDROcodone/acetaminophen (NORCO)  MG Tab; Take 1 Tablet by mouth every 8 hours as needed for Moderate Pain (back pain) for up to 30 days.  Dispense: 90 Tablet; Refill: 0  - HYDROcodone/acetaminophen (NORCO)  MG Tab; Take 1 Tablet by mouth every 8 hours as needed for Moderate Pain (back pain) for up to 30 days.  Dispense: 90 Tablet; Refill: 0    2. Primary hypertension  Continue enalapril 20 mg    3. Prediabetes  Improved since last year, continue to monitor             "

## 2024-02-08 DIAGNOSIS — I10 PRIMARY HYPERTENSION: ICD-10-CM

## 2024-02-08 RX ORDER — ENALAPRIL MALEATE 20 MG/1
20 TABLET ORAL
Qty: 90 TABLET | Refills: 0 | Status: SHIPPED | OUTPATIENT
Start: 2024-02-08

## 2024-02-09 LAB
AMPHET CTO UR CFM-MCNC: POSITIVE NG/ML
BARBITURATES CTO UR CFM-MCNC: NEGATIVE NG/ML
BENZODIAZ CTO UR CFM-MCNC: NEGATIVE NG/ML
BUPRENORPHINE UR-MCNC: NEGATIVE NG/ML
CANNABINOIDS CTO UR CFM-MCNC: NORMAL NG/ML
CARISOPRODOL UR-MCNC: NEGATIVE NG/ML
COCAINE CTO UR CFM-MCNC: NEGATIVE NG/ML
CREAT UR-MCNC: 138.2 MG/DL (ref 20–400)
DRUG SCREEN COMMENT UR-IMP: NORMAL
ETHYL GLUCURONIDE UR QL SCN: NEGATIVE NG/ML
FENTANYL UR-MCNC: NEGATIVE NG/ML
MEPERIDINE CTO UR SCN-MCNC: NEGATIVE NG/ML
METHADONE CTO UR CFM-MCNC: NEGATIVE NG/ML
OPIATES UR QL SCN: NEGATIVE NG/ML
OXYCDOXYM URSCRN 2005102: NEGATIVE NG/ML
PCP CTO UR CFM-MCNC: NEGATIVE NG/ML
PROPOXYPH CTO UR CFM-MCNC: NEGATIVE NG/ML
TAPENTADOL UR-MCNC: NEGATIVE NG/ML
TRAMADOL CTO UR SCN-MCNC: NEGATIVE NG/ML
ZOLPIDEM UR-MCNC: NEGATIVE NG/ML

## 2024-02-12 LAB
AMPHET UR CFM-MCNC: <50 NG/ML
MDA UR CFM-MCNC: <200 NG/ML
MDEA UR CFM-MCNC: <200 NG/ML
MDMA UR CFM-MCNC: <200 NG/ML
METHAMPHET UR CFM-MCNC: <200 NG/ML
PHENTERMINE UR CFM-MCNC: <200 NG/ML
THC UR CFM-MCNC: 29 NG/ML

## 2024-04-07 DIAGNOSIS — N52.9 ERECTILE DYSFUNCTION, UNSPECIFIED ERECTILE DYSFUNCTION TYPE: ICD-10-CM

## 2024-04-08 RX ORDER — SILDENAFIL 100 MG/1
TABLET, FILM COATED ORAL
Qty: 30 TABLET | Refills: 0 | Status: SHIPPED | OUTPATIENT
Start: 2024-04-08

## 2024-04-08 NOTE — TELEPHONE ENCOUNTER
Received request via: Patient    Was the patient seen in the last year in this department? Yes    Does the patient have an active prescription (recently filled or refills available) for medication(s) requested? No    [facility]    Does the patient have long-term Plus and need 100 day supply (blood pressure, diabetes and cholesterol meds only)? Patient does not have SCP

## 2024-05-01 ENCOUNTER — OFFICE VISIT (OUTPATIENT)
Dept: MEDICAL GROUP | Facility: PHYSICIAN GROUP | Age: 59
End: 2024-05-01
Payer: OTHER GOVERNMENT

## 2024-05-01 VITALS
DIASTOLIC BLOOD PRESSURE: 90 MMHG | HEIGHT: 73 IN | TEMPERATURE: 97.5 F | SYSTOLIC BLOOD PRESSURE: 132 MMHG | OXYGEN SATURATION: 98 % | HEART RATE: 74 BPM | BODY MASS INDEX: 31.14 KG/M2 | RESPIRATION RATE: 14 BRPM | WEIGHT: 235 LBS

## 2024-05-01 DIAGNOSIS — I10 PRIMARY HYPERTENSION: ICD-10-CM

## 2024-05-01 DIAGNOSIS — F51.01 PRIMARY INSOMNIA: ICD-10-CM

## 2024-05-01 DIAGNOSIS — M54.16 LUMBAR RADICULOPATHY: ICD-10-CM

## 2024-05-01 DIAGNOSIS — F90.0 ATTENTION DEFICIT HYPERACTIVITY DISORDER (ADHD), PREDOMINANTLY INATTENTIVE TYPE: ICD-10-CM

## 2024-05-01 PROCEDURE — 3075F SYST BP GE 130 - 139MM HG: CPT | Performed by: PHYSICIAN ASSISTANT

## 2024-05-01 PROCEDURE — 3080F DIAST BP >= 90 MM HG: CPT | Performed by: PHYSICIAN ASSISTANT

## 2024-05-01 PROCEDURE — 99214 OFFICE O/P EST MOD 30 MIN: CPT | Performed by: PHYSICIAN ASSISTANT

## 2024-05-01 RX ORDER — DEXTROAMPHETAMINE SACCHARATE, AMPHETAMINE ASPARTATE MONOHYDRATE, DEXTROAMPHETAMINE SULFATE AND AMPHETAMINE SULFATE 5; 5; 5; 5 MG/1; MG/1; MG/1; MG/1
20 CAPSULE, EXTENDED RELEASE ORAL EVERY MORNING
Qty: 30 CAPSULE | Refills: 0 | Status: SHIPPED | OUTPATIENT
Start: 2024-05-01 | End: 2024-05-31

## 2024-05-01 RX ORDER — HYDROCODONE BITARTRATE AND ACETAMINOPHEN 10; 325 MG/1; MG/1
1 TABLET ORAL EVERY 8 HOURS PRN
Qty: 90 TABLET | Refills: 0 | Status: SHIPPED | OUTPATIENT
Start: 2024-05-31 | End: 2024-06-30

## 2024-05-01 RX ORDER — DEXTROAMPHETAMINE SACCHARATE, AMPHETAMINE ASPARTATE MONOHYDRATE, DEXTROAMPHETAMINE SULFATE AND AMPHETAMINE SULFATE 5; 5; 5; 5 MG/1; MG/1; MG/1; MG/1
20 CAPSULE, EXTENDED RELEASE ORAL EVERY MORNING
Qty: 30 CAPSULE | Refills: 0 | Status: SHIPPED | OUTPATIENT
Start: 2024-05-31 | End: 2024-06-30

## 2024-05-01 RX ORDER — HYDROCODONE BITARTRATE AND ACETAMINOPHEN 10; 325 MG/1; MG/1
1 TABLET ORAL EVERY 8 HOURS PRN
Qty: 90 TABLET | Refills: 0 | Status: SHIPPED | OUTPATIENT
Start: 2024-05-01 | End: 2024-05-31

## 2024-05-01 RX ORDER — ENALAPRIL MALEATE 20 MG/1
20 TABLET ORAL
Qty: 90 TABLET | Refills: 3 | Status: SHIPPED | OUTPATIENT
Start: 2024-05-01

## 2024-05-01 RX ORDER — TRAZODONE HYDROCHLORIDE 50 MG/1
50 TABLET ORAL NIGHTLY
Qty: 90 TABLET | Refills: 3 | Status: SHIPPED | OUTPATIENT
Start: 2024-05-01

## 2024-05-01 RX ORDER — HYDROCODONE BITARTRATE AND ACETAMINOPHEN 10; 325 MG/1; MG/1
1 TABLET ORAL EVERY 8 HOURS PRN
Qty: 90 TABLET | Refills: 0 | Status: SHIPPED | OUTPATIENT
Start: 2024-06-30 | End: 2024-07-30

## 2024-05-01 RX ORDER — DEXTROAMPHETAMINE SACCHARATE, AMPHETAMINE ASPARTATE MONOHYDRATE, DEXTROAMPHETAMINE SULFATE AND AMPHETAMINE SULFATE 5; 5; 5; 5 MG/1; MG/1; MG/1; MG/1
20 CAPSULE, EXTENDED RELEASE ORAL EVERY MORNING
Qty: 30 CAPSULE | Refills: 0 | Status: SHIPPED | OUTPATIENT
Start: 2024-06-30 | End: 2024-07-30

## 2024-05-01 ASSESSMENT — FIBROSIS 4 INDEX: FIB4 SCORE: 0.51

## 2024-05-01 NOTE — PROGRESS NOTES
CC:  Chief Complaint   Patient presents with    Medication Refill     traZODone, enalapril, adderrall, norco        HISTORY OF PRESENT ILLNESS: Patient is a 58 y.o. male established patient presenting with issues below  Pt due for refill of his pain medications for his lumbar radiculopathy  Requesting refill of his adderall xr 20mg.   HTN borderline.   Needing refill of trazodone.     Current Outpatient Medications   Medication Sig Dispense Refill    sildenafil citrate (VIAGRA) 100 MG tablet TAKE ONE TABLET BY MOUTH EVERY DAY AS NEEDED FOR ERECTILE DYSFUNCTION 30 Tablet 0    enalapril (VASOTEC) 20 MG tablet TAKE ONE TABLET BY MOUTH EVERY DAY 90 Tablet 0    ketorolac (TORADOL) 10 MG Tab Take 10 mg by mouth.      HYDROcodone/acetaminophen (NORCO)  MG Tab Take 1 Tablet by mouth every 8 hours as needed for Moderate Pain (back pain) for up to 30 days. 90 Tablet 0    traZODone (DESYREL) 50 MG Tab TAKE ONE TABLET BY MOUTH EVERY DAY AT BEDTIME AS NEEDED FOR SLEEP 90 Tablet 0    tizanidine (ZANAFLEX) 4 MG capsule Take 1 Capsule by mouth 1 time a day as needed (pain). 90 Capsule 1    atorvastatin (LIPITOR) 80 MG tablet Take 1 Tablet by mouth every day. 90 Tablet 3    buPROPion (WELLBUTRIN XL) 300 MG XL tablet Take 1 Tablet by mouth every morning. 90 Tablet 3    fluticasone (FLOVENT HFA) 110 MCG/ACT Aerosol Inhale 1 Puff 2 times a day. 3 Each 3    pantoprazole (PROTONIX) 40 MG Tablet Delayed Response Take 1 Tablet by mouth every day. 90 Tablet 3    Naloxone (NARCAN) 4 MG/0.1ML Liquid One spray in one nostril for overdose and call 911. 1 Each 0    ibuprofen (MOTRIN) 200 MG Tab Take 4 Tablets by mouth.      docusate sodium (COLACE) 50 MG Cap Take 50 mg by mouth.      ondansetron (ZOFRAN ODT) 4 MG TABLET DISPERSIBLE Take 4 mg by mouth. (Patient not taking: Reported on 5/1/2024)       No current facility-administered medications for this visit.        ROS:     ROS    Exam:    BP (!) 132/90   Pulse 74   Temp 36.4 °C (97.5  "°F) (Temporal)   Resp 14   Ht 1.854 m (6' 1\")   Wt 107 kg (235 lb)   SpO2 98%  Body mass index is 31 kg/m².    General:  Well nourished, well developed male in NAD  Head is grossly normal.  Neck: Supple.   Skin: Warm and dry. No obvious lesions  Neuro: Normal muscle tone. Gait normal. Alert and oriented.  Psych: Normal mood and affect      Please note that this dictation was created using voice recognition software. I have made every reasonable attempt to correct obvious errors, but I expect that there are errors of grammar and possibly content that I did not discover before finalizing the note.        Assessment/Plan:    1. Primary hypertension  Stable.  Continue enalapril 20 mg.  - enalapril (VASOTEC) 20 MG tablet; Take 1 Tablet by mouth every day.  Dispense: 90 Tablet; Refill: 3    2. Primary insomnia  Refill trazodone sent in  - traZODone (DESYREL) 50 MG Tab; Take 1 Tablet by mouth every evening.  Dispense: 90 Tablet; Refill: 3    3. Attention deficit hyperactivity disorder (ADHD), predominantly inattentive type  PDMP checked.  Refill of Adderall XR 20 mg sent in  - amphetamine-dextroamphetamine (ADDERALL XR, 20MG,) 20 MG per XR capsule; Take 1 Capsule by mouth every morning for 30 days.  Dispense: 30 Capsule; Refill: 0  - amphetamine-dextroamphetamine (ADDERALL XR, 20MG,) 20 MG per XR capsule; Take 1 Capsule by mouth every morning for 30 days.  Dispense: 30 Capsule; Refill: 0  - amphetamine-dextroamphetamine (ADDERALL XR, 20MG,) 20 MG per XR capsule; Take 1 Capsule by mouth every morning for 30 days.  Dispense: 30 Capsule; Refill: 0    4. Lumbar radiculopathy  Refill of Norco 10/325 mg sent in.  - HYDROcodone/acetaminophen (NORCO)  MG Tab; Take 1 Tablet by mouth every 8 hours as needed for Moderate Pain (back pain) for up to 30 days.  Dispense: 90 Tablet; Refill: 0  - HYDROcodone/acetaminophen (NORCO)  MG Tab; Take 1 Tablet by mouth every 8 hours as needed for Moderate Pain (back pain) for up to " 30 days.  Dispense: 90 Tablet; Refill: 0  - HYDROcodone/acetaminophen (NORCO)  MG Tab; Take 1 Tablet by mouth every 8 hours as needed for Moderate Pain (back pain) for up to 30 days.  Dispense: 90 Tablet; Refill: 0

## 2024-07-10 DIAGNOSIS — M54.16 LUMBAR RADICULOPATHY: ICD-10-CM

## 2024-07-10 RX ORDER — TIZANIDINE HYDROCHLORIDE 4 MG/1
CAPSULE, GELATIN COATED ORAL
Qty: 90 CAPSULE | Refills: 1 | Status: SHIPPED | OUTPATIENT
Start: 2024-07-10

## 2024-08-14 ENCOUNTER — OFFICE VISIT (OUTPATIENT)
Dept: MEDICAL GROUP | Facility: PHYSICIAN GROUP | Age: 59
End: 2024-08-14
Payer: OTHER GOVERNMENT

## 2024-08-14 VITALS
HEIGHT: 73 IN | RESPIRATION RATE: 16 BRPM | OXYGEN SATURATION: 98 % | BODY MASS INDEX: 29.95 KG/M2 | SYSTOLIC BLOOD PRESSURE: 156 MMHG | HEART RATE: 70 BPM | TEMPERATURE: 97 F | WEIGHT: 226 LBS | DIASTOLIC BLOOD PRESSURE: 98 MMHG

## 2024-08-14 DIAGNOSIS — M54.16 LUMBAR RADICULOPATHY: ICD-10-CM

## 2024-08-14 DIAGNOSIS — I10 PRIMARY HYPERTENSION: ICD-10-CM

## 2024-08-14 DIAGNOSIS — N52.9 ERECTILE DYSFUNCTION, UNSPECIFIED ERECTILE DYSFUNCTION TYPE: ICD-10-CM

## 2024-08-14 DIAGNOSIS — F90.0 ATTENTION DEFICIT HYPERACTIVITY DISORDER (ADHD), PREDOMINANTLY INATTENTIVE TYPE: ICD-10-CM

## 2024-08-14 PROCEDURE — 3080F DIAST BP >= 90 MM HG: CPT | Performed by: PHYSICIAN ASSISTANT

## 2024-08-14 PROCEDURE — 99214 OFFICE O/P EST MOD 30 MIN: CPT | Performed by: PHYSICIAN ASSISTANT

## 2024-08-14 PROCEDURE — 3077F SYST BP >= 140 MM HG: CPT | Performed by: PHYSICIAN ASSISTANT

## 2024-08-14 RX ORDER — HYDROCODONE BITARTRATE AND ACETAMINOPHEN 10; 325 MG/1; MG/1
1 TABLET ORAL EVERY 8 HOURS PRN
Qty: 90 TABLET | Refills: 0 | Status: SHIPPED | OUTPATIENT
Start: 2024-08-14 | End: 2024-09-13

## 2024-08-14 RX ORDER — TRAMADOL HYDROCHLORIDE 50 MG/1
25 TABLET ORAL
COMMUNITY

## 2024-08-14 RX ORDER — AMLODIPINE BESYLATE 5 MG/1
5 TABLET ORAL DAILY
COMMUNITY
Start: 2024-06-25 | End: 2024-08-14

## 2024-08-14 RX ORDER — HYDROCODONE BITARTRATE AND ACETAMINOPHEN 10; 325 MG/1; MG/1
1 TABLET ORAL EVERY 6 HOURS PRN
COMMUNITY
End: 2024-08-14 | Stop reason: SDUPTHER

## 2024-08-14 RX ORDER — AMLODIPINE BESYLATE 10 MG/1
10 TABLET ORAL DAILY
Qty: 90 TABLET | Refills: 1 | Status: SHIPPED | OUTPATIENT
Start: 2024-08-14

## 2024-08-14 RX ORDER — DEXTROAMPHETAMINE SACCHARATE, AMPHETAMINE ASPARTATE MONOHYDRATE, DEXTROAMPHETAMINE SULFATE AND AMPHETAMINE SULFATE 5; 5; 5; 5 MG/1; MG/1; MG/1; MG/1
20 CAPSULE, EXTENDED RELEASE ORAL EVERY MORNING
Qty: 30 CAPSULE | Refills: 0 | Status: SHIPPED | OUTPATIENT
Start: 2024-09-13 | End: 2024-10-13

## 2024-08-14 RX ORDER — DEXTROAMPHETAMINE SACCHARATE, AMPHETAMINE ASPARTATE MONOHYDRATE, DEXTROAMPHETAMINE SULFATE AND AMPHETAMINE SULFATE 5; 5; 5; 5 MG/1; MG/1; MG/1; MG/1
20 CAPSULE, EXTENDED RELEASE ORAL EVERY MORNING
Qty: 30 CAPSULE | Refills: 0 | Status: SHIPPED | OUTPATIENT
Start: 2024-08-14 | End: 2024-09-13

## 2024-08-14 RX ORDER — DEXTROAMPHETAMINE SACCHARATE, AMPHETAMINE ASPARTATE MONOHYDRATE, DEXTROAMPHETAMINE SULFATE AND AMPHETAMINE SULFATE 5; 5; 5; 5 MG/1; MG/1; MG/1; MG/1
20 CAPSULE, EXTENDED RELEASE ORAL EVERY MORNING
Qty: 30 CAPSULE | Refills: 0 | Status: SHIPPED | OUTPATIENT
Start: 2024-10-13 | End: 2024-11-12

## 2024-08-14 RX ORDER — HYDROCODONE BITARTRATE AND ACETAMINOPHEN 10; 325 MG/1; MG/1
1 TABLET ORAL EVERY 8 HOURS PRN
Qty: 90 TABLET | Refills: 0 | Status: SHIPPED | OUTPATIENT
Start: 2024-09-13 | End: 2024-10-13

## 2024-08-14 RX ORDER — HYDROCODONE BITARTRATE AND ACETAMINOPHEN 10; 325 MG/1; MG/1
1 TABLET ORAL EVERY 8 HOURS PRN
Qty: 90 TABLET | Refills: 0 | Status: SHIPPED | OUTPATIENT
Start: 2024-10-13 | End: 2024-11-12

## 2024-08-14 ASSESSMENT — FIBROSIS 4 INDEX: FIB4 SCORE: 0.5

## 2024-08-14 NOTE — PROGRESS NOTES
CC:  Chief Complaint   Patient presents with    Medication Refill     NORCO,traZODone       HISTORY OF PRESENT ILLNESS: Patient is a 59 y.o. male established patient presenting with issues below  Pt seen in ER about two months ago for very intense HA and very high BP. Was hospitalized for 3 days. His cardiac and neuro workup both returned normal. He was referred to neurology for outpatient management but he is waiting to set up appt for neurology until he gets the bill from the ER.   Pt's BP at home has been in 160s systolic. He was started on best 5 mg when he was in the ER.  He also continues to take enalapril 20 mg.  Patient is due for refills of his Adderall XR 20 mg for his ADHD and his Norco 10/325 mg for his lumbar radiculopathy.    Current Outpatient Medications   Medication Sig Dispense Refill    amLODIPine (NORVASC) 5 MG Tab Take 5 mg by mouth every day.      HYDROcodone/acetaminophen (NORCO)  MG Tab Take 1 Tablet by mouth every 6 hours as needed.      tizanidine (ZANAFLEX) 4 MG capsule TAKE ONE CAPSULE BY MOUTH EVERY DAY AS NEEDED FOR PAIN 90 Capsule 1    enalapril (VASOTEC) 20 MG tablet Take 1 Tablet by mouth every day. 90 Tablet 3    traZODone (DESYREL) 50 MG Tab Take 1 Tablet by mouth every evening. 90 Tablet 3    sildenafil citrate (VIAGRA) 100 MG tablet TAKE ONE TABLET BY MOUTH EVERY DAY AS NEEDED FOR ERECTILE DYSFUNCTION 30 Tablet 0    ketorolac (TORADOL) 10 MG Tab Take 10 mg by mouth.      atorvastatin (LIPITOR) 80 MG tablet Take 1 Tablet by mouth every day. 90 Tablet 3    buPROPion (WELLBUTRIN XL) 300 MG XL tablet Take 1 Tablet by mouth every morning. 90 Tablet 3    pantoprazole (PROTONIX) 40 MG Tablet Delayed Response Take 1 Tablet by mouth every day. 90 Tablet 3    Naloxone (NARCAN) 4 MG/0.1ML Liquid One spray in one nostril for overdose and call 911. 1 Each 0    ibuprofen (MOTRIN) 200 MG Tab Take 4 Tablets by mouth.      traMADol (ULTRAM) 50 MG Tab Take 25 mg by mouth. (Patient not  "taking: Reported on 8/14/2024)      ondansetron (ZOFRAN ODT) 4 MG TABLET DISPERSIBLE Take 4 mg by mouth. (Patient not taking: Reported on 5/1/2024)      fluticasone (FLOVENT HFA) 110 MCG/ACT Aerosol Inhale 1 Puff 2 times a day. (Patient not taking: Reported on 8/14/2024) 3 Each 3     No current facility-administered medications for this visit.        ROS:     ROS    Exam:    BP (!) 156/98   Pulse 70   Temp 36.1 °C (97 °F) (Temporal)   Resp 16   Ht 1.854 m (6' 1\")   Wt 103 kg (226 lb)   SpO2 98%  Body mass index is 29.82 kg/m².    General:  Well nourished, well developed male in NAD  Head is grossly normal.  Neck: Supple.   Pulmonary: Clear to auscultation. No ronchi, wheezing or rales  Cardiac: Regular rate and rhythm. No murmurs.  Skin: Warm and dry. No obvious lesions  Neuro: Normal muscle tone. Gait normal. Alert and oriented.  Psych: Normal mood and affect      Please note that this dictation was created using voice recognition software. I have made every reasonable attempt to correct obvious errors, but I expect that there are errors of grammar and possibly content that I did not discover before finalizing the note.        Assessment/Plan:  1. Primary hypertension  Increase Norvasc to 10 mg.  Also instructed to increase his enalapril to 30 mg once daily.  Recheck blood pressure again in 1 month  - amLODIPine (NORVASC) 10 MG Tab; Take 1 Tablet by mouth every day.  Dispense: 90 Tablet; Refill: 1    2. Lumbar radiculopathy  PDMP checked.  Refills of pain medication sent in  - HYDROcodone/acetaminophen (NORCO)  MG Tab; Take 1 Tablet by mouth every 8 hours as needed for Severe Pain for up to 30 days.  Dispense: 90 Tablet; Refill: 0  - HYDROcodone/acetaminophen (NORCO)  MG Tab; Take 1 Tablet by mouth every 8 hours as needed for Severe Pain for up to 30 days.  Dispense: 90 Tablet; Refill: 0  - HYDROcodone/acetaminophen (NORCO)  MG Tab; Take 1 Tablet by mouth every 8 hours as needed for Severe " Pain for up to 30 days.  Dispense: 90 Tablet; Refill: 0    3. Erectile dysfunction, unspecified erectile dysfunction type      4. Attention deficit hyperactivity disorder (ADHD), predominantly inattentive type  Refills of Adderall XR 20 mg sent in.  - amphetamine-dextroamphetamine (ADDERALL XR) 20 MG per XR capsule; Take 1 Capsule by mouth every morning for 30 days.  Dispense: 30 Capsule; Refill: 0  - amphetamine-dextroamphetamine (ADDERALL XR) 20 MG per XR capsule; Take 1 Capsule by mouth every morning for 30 days.  Dispense: 30 Capsule; Refill: 0  - amphetamine-dextroamphetamine (ADDERALL XR) 20 MG per XR capsule; Take 1 Capsule by mouth every morning for 30 days.  Dispense: 30 Capsule; Refill: 0

## 2024-10-15 DIAGNOSIS — M54.16 LUMBAR RADICULOPATHY: ICD-10-CM

## 2024-10-15 DIAGNOSIS — F90.0 ATTENTION DEFICIT HYPERACTIVITY DISORDER (ADHD), PREDOMINANTLY INATTENTIVE TYPE: ICD-10-CM

## 2024-10-15 DIAGNOSIS — N52.9 ERECTILE DYSFUNCTION, UNSPECIFIED ERECTILE DYSFUNCTION TYPE: ICD-10-CM

## 2024-10-17 DIAGNOSIS — N52.9 ERECTILE DYSFUNCTION, UNSPECIFIED ERECTILE DYSFUNCTION TYPE: ICD-10-CM

## 2024-10-17 RX ORDER — SILDENAFIL 100 MG/1
TABLET, FILM COATED ORAL
Qty: 30 TABLET | Refills: 0 | Status: SHIPPED | OUTPATIENT
Start: 2024-10-17

## 2024-10-17 RX ORDER — TIZANIDINE HYDROCHLORIDE 4 MG/1
4 CAPSULE, GELATIN COATED ORAL 3 TIMES DAILY
Qty: 90 CAPSULE | Refills: 1 | Status: SHIPPED | OUTPATIENT
Start: 2024-10-17

## 2024-10-17 RX ORDER — SILDENAFIL 100 MG/1
100 TABLET, FILM COATED ORAL PRN
Qty: 30 TABLET | Refills: 0 | Status: SHIPPED | OUTPATIENT
Start: 2024-10-17

## 2024-10-17 RX ORDER — DEXTROAMPHETAMINE SACCHARATE, AMPHETAMINE ASPARTATE MONOHYDRATE, DEXTROAMPHETAMINE SULFATE AND AMPHETAMINE SULFATE 5; 5; 5; 5 MG/1; MG/1; MG/1; MG/1
20 CAPSULE, EXTENDED RELEASE ORAL EVERY MORNING
Qty: 30 CAPSULE | Refills: 0 | Status: SHIPPED | OUTPATIENT
Start: 2024-10-17 | End: 2024-11-16

## 2024-11-18 ENCOUNTER — OFFICE VISIT (OUTPATIENT)
Dept: MEDICAL GROUP | Facility: PHYSICIAN GROUP | Age: 59
End: 2024-11-18
Payer: OTHER GOVERNMENT

## 2024-11-18 VITALS
BODY MASS INDEX: 31.41 KG/M2 | RESPIRATION RATE: 16 BRPM | WEIGHT: 237 LBS | DIASTOLIC BLOOD PRESSURE: 94 MMHG | TEMPERATURE: 97.9 F | SYSTOLIC BLOOD PRESSURE: 130 MMHG | HEIGHT: 73 IN | HEART RATE: 86 BPM | OXYGEN SATURATION: 97 %

## 2024-11-18 DIAGNOSIS — I10 PRIMARY HYPERTENSION: ICD-10-CM

## 2024-11-18 DIAGNOSIS — F51.01 PRIMARY INSOMNIA: ICD-10-CM

## 2024-11-18 DIAGNOSIS — M54.16 LUMBAR RADICULOPATHY: ICD-10-CM

## 2024-11-18 DIAGNOSIS — Z23 NEED FOR VACCINATION: ICD-10-CM

## 2024-11-18 PROCEDURE — 3075F SYST BP GE 130 - 139MM HG: CPT | Performed by: PHYSICIAN ASSISTANT

## 2024-11-18 PROCEDURE — 90656 IIV3 VACC NO PRSV 0.5 ML IM: CPT | Performed by: PHYSICIAN ASSISTANT

## 2024-11-18 PROCEDURE — 3080F DIAST BP >= 90 MM HG: CPT | Performed by: PHYSICIAN ASSISTANT

## 2024-11-18 PROCEDURE — 90471 IMMUNIZATION ADMIN: CPT | Performed by: PHYSICIAN ASSISTANT

## 2024-11-18 PROCEDURE — 99214 OFFICE O/P EST MOD 30 MIN: CPT | Mod: 25 | Performed by: PHYSICIAN ASSISTANT

## 2024-11-18 RX ORDER — HYDROCODONE BITARTRATE AND ACETAMINOPHEN 10; 325 MG/1; MG/1
1 TABLET ORAL EVERY 8 HOURS PRN
Qty: 90 TABLET | Refills: 0 | Status: SHIPPED | OUTPATIENT
Start: 2024-12-18 | End: 2025-01-17

## 2024-11-18 RX ORDER — ENALAPRIL MALEATE 20 MG/1
30 TABLET ORAL
Qty: 135 TABLET | Refills: 3 | Status: SHIPPED | OUTPATIENT
Start: 2024-11-18

## 2024-11-18 RX ORDER — DEXTROAMPHETAMINE SACCHARATE, AMPHETAMINE ASPARTATE MONOHYDRATE, DEXTROAMPHETAMINE SULFATE AND AMPHETAMINE SULFATE 5; 5; 5; 5 MG/1; MG/1; MG/1; MG/1
CAPSULE, EXTENDED RELEASE ORAL
COMMUNITY
Start: 2024-11-14

## 2024-11-18 RX ORDER — HYDROCODONE BITARTRATE AND ACETAMINOPHEN 10; 325 MG/1; MG/1
1 TABLET ORAL EVERY 8 HOURS PRN
Qty: 90 TABLET | Refills: 0 | Status: SHIPPED | OUTPATIENT
Start: 2024-11-18 | End: 2024-12-18

## 2024-11-18 RX ORDER — HYDROCODONE BITARTRATE AND ACETAMINOPHEN 10; 325 MG/1; MG/1
1 TABLET ORAL EVERY 8 HOURS PRN
Qty: 90 TABLET | Refills: 0 | Status: SHIPPED | OUTPATIENT
Start: 2025-01-17 | End: 2025-02-16

## 2024-11-18 RX ORDER — TRAZODONE HYDROCHLORIDE 50 MG/1
50 TABLET, FILM COATED ORAL NIGHTLY
Qty: 90 TABLET | Refills: 3 | Status: SHIPPED | OUTPATIENT
Start: 2024-11-18

## 2024-11-18 RX ORDER — BISACODYL 5 MG/1
15 TABLET, DELAYED RELEASE ORAL
COMMUNITY
Start: 2024-10-25

## 2024-11-18 ASSESSMENT — FIBROSIS 4 INDEX: FIB4 SCORE: 0.5

## 2024-11-19 NOTE — PROGRESS NOTES
CC:  Chief Complaint   Patient presents with    Medication Refill     traZODone, enalapril 30mg, Norco        HISTORY OF PRESENT ILLNESS: Patient is a 59 y.o. male established patient presenting with issues below  Pt needing refill of norco for chronic lumbar radiculopathy.   HTN much improved since increasing enalapril to 30mg. Has been tracking at home too with very good readings.   Continues to take trazodone with good effect for insomnia.     Current Outpatient Medications   Medication Sig Dispense Refill    amphetamine-dextroamphetamine (ADDERALL XR) 20 MG per XR capsule       sildenafil citrate (VIAGRA) 100 MG tablet Take 1 Tablet by mouth as needed for Erectile Dysfunction. 30 Tablet 0    tizanidine (ZANAFLEX) 4 MG capsule Take 1 Capsule by mouth 3 times a day. 90 Capsule 1    amLODIPine (NORVASC) 10 MG Tab Take 1 Tablet by mouth every day. 90 Tablet 1    enalapril (VASOTEC) 20 MG tablet Take 1 Tablet by mouth every day. 90 Tablet 3    traZODone (DESYREL) 50 MG Tab Take 1 Tablet by mouth every evening. 90 Tablet 3    ondansetron (ZOFRAN ODT) 4 MG TABLET DISPERSIBLE Take 4 mg by mouth.      atorvastatin (LIPITOR) 80 MG tablet Take 1 Tablet by mouth every day. 90 Tablet 3    buPROPion (WELLBUTRIN XL) 300 MG XL tablet Take 1 Tablet by mouth every morning. 90 Tablet 3    pantoprazole (PROTONIX) 40 MG Tablet Delayed Response Take 1 Tablet by mouth every day. 90 Tablet 3    Naloxone (NARCAN) 4 MG/0.1ML Liquid One spray in one nostril for overdose and call 911. 1 Each 0    ibuprofen (MOTRIN) 200 MG Tab Take 4 Tablets by mouth.      bisacodyl (DULCOLAX) 5 MG EC tablet Take 15 mg by mouth. (Patient not taking: Reported on 11/18/2024)      sildenafil citrate (VIAGRA) 100 MG tablet TAKE ONE TABLET BY MOUTH EVERY DAY AS NEEDED FOR ERECTILE DYSFUNCTION 30 Tablet 0    traMADol (ULTRAM) 50 MG Tab Take 25 mg by mouth. (Patient not taking: Reported on 11/18/2024)      ketorolac (TORADOL) 10 MG Tab Take 10 mg by mouth.  "(Patient not taking: Reported on 11/18/2024)      fluticasone (FLOVENT HFA) 110 MCG/ACT Aerosol Inhale 1 Puff 2 times a day. (Patient not taking: Reported on 11/18/2024) 3 Each 3     No current facility-administered medications for this visit.        ROS:     ROS    Exam:    BP (!) 130/94   Pulse 86   Temp 36.6 °C (97.9 °F) (Temporal)   Resp 16   Ht 1.854 m (6' 1\")   Wt 108 kg (237 lb)   SpO2 97%  Body mass index is 31.27 kg/m².    General:  Well nourished, well developed male in NAD  Head is grossly normal.  Neck: Supple.   Skin: Warm and dry. No obvious lesions  Neuro: Normal muscle tone. Gait normal. Alert and oriented.  Psych: Normal mood and affect      Please note that this dictation was created using voice recognition software. I have made every reasonable attempt to correct obvious errors, but I expect that there are errors of grammar and possibly content that I did not discover before finalizing the note.        Assessment/Plan:    1. Need for vaccination    - INFLUENZA VACCINE TRI INJ (PF)     2. Primary insomnia  Continue trazodone 50mg  - traZODone (DESYREL) 50 MG Tab; Take 1 Tablet by mouth every evening.  Dispense: 90 Tablet; Refill: 3    3. Primary hypertension  Much improved.   - enalapril (VASOTEC) 20 MG tablet; Take 1.5 Tablets by mouth every day.  Dispense: 135 Tablet; Refill: 3    4. Lumbar radiculopathy  Continue norco 10/325mg.   - HYDROcodone/acetaminophen (NORCO)  MG Tab; Take 1 Tablet by mouth every 8 hours as needed for Severe Pain for up to 30 days.  Dispense: 90 Tablet; Refill: 0  - HYDROcodone/acetaminophen (NORCO)  MG Tab; Take 1 Tablet by mouth every 8 hours as needed for Severe Pain for up to 30 days.  Dispense: 90 Tablet; Refill: 0  - HYDROcodone/acetaminophen (NORCO)  MG Tab; Take 1 Tablet by mouth every 8 hours as needed for Severe Pain for up to 30 days.  Dispense: 90 Tablet; Refill: 0           "

## 2024-12-02 DIAGNOSIS — E78.2 MIXED HYPERLIPIDEMIA: ICD-10-CM

## 2024-12-02 RX ORDER — BUPROPION HYDROCHLORIDE 300 MG/1
300 TABLET ORAL EVERY MORNING
Qty: 90 TABLET | Refills: 3 | Status: SHIPPED | OUTPATIENT
Start: 2024-12-02

## 2024-12-02 RX ORDER — ATORVASTATIN CALCIUM 80 MG/1
80 TABLET, FILM COATED ORAL
Qty: 90 TABLET | Refills: 3 | Status: SHIPPED | OUTPATIENT
Start: 2024-12-02

## 2024-12-03 NOTE — TELEPHONE ENCOUNTER
Received request via: Pharmacy    Was the patient seen in the last year in this department? Yes    Does the patient have an active prescription (recently filled or refills available) for medication(s) requested? No    Ph[facility] Name: [facility]     Does the patient have correction Plus and need 100-day supply? (This applies to ALL medications) Patient does not have SCP

## 2024-12-03 NOTE — TELEPHONE ENCOUNTER
Received request via: Pharmacy    Was the patient seen in the last year in this department? Yes    Does the patient have an active prescription (recently filled or refills available) for medication(s) requested? No    [facility]     Does the patient have [facility] Plus and need 100-day supply? (This applies to ALL medications) Patient does not have SCP

## 2025-01-26 DIAGNOSIS — N52.9 ERECTILE DYSFUNCTION, UNSPECIFIED ERECTILE DYSFUNCTION TYPE: ICD-10-CM

## 2025-01-27 RX ORDER — SILDENAFIL 100 MG/1
100 TABLET, FILM COATED ORAL PRN
Qty: 30 TABLET | Refills: 0 | Status: SHIPPED | OUTPATIENT
Start: 2025-01-27

## 2025-01-27 NOTE — TELEPHONE ENCOUNTER
Received request via: Patient    Was the patient seen in the last year in this department? Yes    Does the patient have an active prescription (recently filled or refills available) for medication(s) requested? No    [facility]    Does the patient have half-way Plus and need 100-day supply? (This applies to ALL medications) Patient does not have SCP

## 2025-01-29 ENCOUNTER — TELEPHONE (OUTPATIENT)
Dept: MEDICAL GROUP | Facility: PHYSICIAN GROUP | Age: 60
End: 2025-01-29
Payer: OTHER GOVERNMENT

## 2025-01-29 NOTE — TELEPHONE ENCOUNTER
Pharmacy called office in regards to mutual patient and would like to know max daily dose for Sildenafil. Please advise

## 2025-02-13 ENCOUNTER — APPOINTMENT (OUTPATIENT)
Dept: MEDICAL GROUP | Facility: PHYSICIAN GROUP | Age: 60
End: 2025-02-13
Payer: OTHER GOVERNMENT

## 2025-02-13 ENCOUNTER — HOSPITAL ENCOUNTER (OUTPATIENT)
Facility: MEDICAL CENTER | Age: 60
End: 2025-02-13
Attending: PHYSICIAN ASSISTANT
Payer: OTHER GOVERNMENT

## 2025-02-13 VITALS
OXYGEN SATURATION: 97 % | RESPIRATION RATE: 12 BRPM | HEART RATE: 75 BPM | SYSTOLIC BLOOD PRESSURE: 120 MMHG | HEIGHT: 73 IN | BODY MASS INDEX: 32.87 KG/M2 | TEMPERATURE: 97.4 F | WEIGHT: 248 LBS | DIASTOLIC BLOOD PRESSURE: 82 MMHG

## 2025-02-13 DIAGNOSIS — M54.16 LUMBAR RADICULOPATHY: ICD-10-CM

## 2025-02-13 DIAGNOSIS — F90.0 ATTENTION DEFICIT HYPERACTIVITY DISORDER (ADHD), PREDOMINANTLY INATTENTIVE TYPE: ICD-10-CM

## 2025-02-13 DIAGNOSIS — F51.01 PRIMARY INSOMNIA: ICD-10-CM

## 2025-02-13 DIAGNOSIS — Z00.00 PHYSICAL EXAM, ANNUAL: ICD-10-CM

## 2025-02-13 DIAGNOSIS — Z23 NEED FOR VACCINATION: ICD-10-CM

## 2025-02-13 PROCEDURE — 99214 OFFICE O/P EST MOD 30 MIN: CPT | Mod: 25 | Performed by: PHYSICIAN ASSISTANT

## 2025-02-13 PROCEDURE — 90677 PCV20 VACCINE IM: CPT | Performed by: PHYSICIAN ASSISTANT

## 2025-02-13 PROCEDURE — 90471 IMMUNIZATION ADMIN: CPT | Performed by: PHYSICIAN ASSISTANT

## 2025-02-13 PROCEDURE — 3074F SYST BP LT 130 MM HG: CPT | Performed by: PHYSICIAN ASSISTANT

## 2025-02-13 PROCEDURE — 80307 DRUG TEST PRSMV CHEM ANLYZR: CPT

## 2025-02-13 PROCEDURE — G0480 DRUG TEST DEF 1-7 CLASSES: HCPCS

## 2025-02-13 PROCEDURE — 3079F DIAST BP 80-89 MM HG: CPT | Performed by: PHYSICIAN ASSISTANT

## 2025-02-13 RX ORDER — HYDROCODONE BITARTRATE AND ACETAMINOPHEN 10; 325 MG/1; MG/1
1 TABLET ORAL EVERY 8 HOURS PRN
Qty: 90 TABLET | Refills: 0 | Status: SHIPPED | OUTPATIENT
Start: 2025-02-13 | End: 2025-03-15

## 2025-02-13 RX ORDER — DEXTROAMPHETAMINE SACCHARATE, AMPHETAMINE ASPARTATE, DEXTROAMPHETAMINE SULFATE AND AMPHETAMINE SULFATE 5; 5; 5; 5 MG/1; MG/1; MG/1; MG/1
20 TABLET ORAL 2 TIMES DAILY
Qty: 60 TABLET | Refills: 0 | Status: SHIPPED | OUTPATIENT
Start: 2025-04-14 | End: 2025-05-14

## 2025-02-13 RX ORDER — HYDROCODONE BITARTRATE AND ACETAMINOPHEN 10; 325 MG/1; MG/1
1 TABLET ORAL EVERY 8 HOURS PRN
Qty: 90 TABLET | Refills: 0 | Status: SHIPPED | OUTPATIENT
Start: 2025-04-14 | End: 2025-05-14

## 2025-02-13 RX ORDER — DEXTROAMPHETAMINE SACCHARATE, AMPHETAMINE ASPARTATE, DEXTROAMPHETAMINE SULFATE AND AMPHETAMINE SULFATE 5; 5; 5; 5 MG/1; MG/1; MG/1; MG/1
20 TABLET ORAL 2 TIMES DAILY
Qty: 60 TABLET | Refills: 0 | Status: SHIPPED | OUTPATIENT
Start: 2025-03-15 | End: 2025-04-14

## 2025-02-13 RX ORDER — DEXTROAMPHETAMINE SACCHARATE, AMPHETAMINE ASPARTATE, DEXTROAMPHETAMINE SULFATE AND AMPHETAMINE SULFATE 5; 5; 5; 5 MG/1; MG/1; MG/1; MG/1
20 TABLET ORAL 2 TIMES DAILY
Qty: 60 TABLET | Refills: 0 | Status: SHIPPED | OUTPATIENT
Start: 2025-02-13 | End: 2025-03-15

## 2025-02-13 RX ORDER — HYDROCODONE BITARTRATE AND ACETAMINOPHEN 10; 325 MG/1; MG/1
1 TABLET ORAL EVERY 8 HOURS PRN
Qty: 90 TABLET | Refills: 0 | Status: SHIPPED | OUTPATIENT
Start: 2025-03-15 | End: 2025-04-14

## 2025-02-13 RX ORDER — TRAZODONE HYDROCHLORIDE 50 MG/1
50 TABLET ORAL NIGHTLY
Qty: 90 TABLET | Refills: 3 | Status: SHIPPED | OUTPATIENT
Start: 2025-02-13

## 2025-02-13 ASSESSMENT — FIBROSIS 4 INDEX: FIB4 SCORE: 0.5

## 2025-02-13 NOTE — PROGRESS NOTES
CC:  Chief Complaint   Patient presents with    Medication Refill     traZODone, ADDERALL XR, NORCO       HISTORY OF PRESENT ILLNESS: Patient is a 59 y.o. male established patient presenting with issues below  Pt needing refill of his norco for his lumbar radiculopathy. Working well at current dose.   Pt requesting to change adderall XR to immediate release form. Has been having trouble with insomnia and thinks the medication is making it worse.     Current Outpatient Medications   Medication Sig Dispense Refill    sildenafil citrate (VIAGRA) 100 MG tablet Take 1 Tablet by mouth as needed for Erectile Dysfunction. 30 Tablet 0    buPROPion (WELLBUTRIN XL) 300 MG XL tablet Take 1 Tablet by mouth every morning. 90 Tablet 3    atorvastatin (LIPITOR) 80 MG tablet TAKE ONE TABLET BY MOUTH EVERY DAY 90 Tablet 3    amphetamine-dextroamphetamine (ADDERALL XR) 20 MG per XR capsule       traZODone (DESYREL) 50 MG Tab Take 1 Tablet by mouth every evening. 90 Tablet 3    enalapril (VASOTEC) 20 MG tablet Take 1.5 Tablets by mouth every day. 135 Tablet 3    HYDROcodone/acetaminophen (NORCO)  MG Tab Take 1 Tablet by mouth every 8 hours as needed for Severe Pain for up to 30 days. 90 Tablet 0    tizanidine (ZANAFLEX) 4 MG capsule Take 1 Capsule by mouth 3 times a day. 90 Capsule 1    amLODIPine (NORVASC) 10 MG Tab Take 1 Tablet by mouth every day. 90 Tablet 1    pantoprazole (PROTONIX) 40 MG Tablet Delayed Response Take 1 Tablet by mouth every day. 90 Tablet 3    Naloxone (NARCAN) 4 MG/0.1ML Liquid One spray in one nostril for overdose and call 911. 1 Each 0    ibuprofen (MOTRIN) 200 MG Tab Take 4 Tablets by mouth.      bisacodyl (DULCOLAX) 5 MG EC tablet Take 15 mg by mouth. (Patient not taking: Reported on 11/18/2024)      traMADol (ULTRAM) 50 MG Tab Take 25 mg by mouth. (Patient not taking: Reported on 8/14/2024)      ketorolac (TORADOL) 10 MG Tab Take 10 mg by mouth. (Patient not taking: Reported on 11/18/2024)       "ondansetron (ZOFRAN ODT) 4 MG TABLET DISPERSIBLE Take 4 mg by mouth. (Patient not taking: Reported on 2/13/2025)      fluticasone (FLOVENT HFA) 110 MCG/ACT Aerosol Inhale 1 Puff 2 times a day. (Patient not taking: Reported on 11/18/2024) 3 Each 3     No current facility-administered medications for this visit.        ROS:     ROS    Exam:    /82   Pulse 75   Temp 36.3 °C (97.4 °F) (Temporal)   Resp 12   Ht 1.854 m (6' 1\")   Wt 112 kg (248 lb)   SpO2 97%  Body mass index is 32.72 kg/m².    General:  Well nourished, well developed male in NAD  Head is grossly normal.  Neck: Supple.   Skin: Warm and dry. No obvious lesions  Neuro: Normal muscle tone. Gait normal. Alert and oriented.  Psych: Normal mood and affect      Please note that this dictation was created using voice recognition software. I have made every reasonable attempt to correct obvious errors, but I expect that there are errors of grammar and possibly content that I did not discover before finalizing the note.        Assessment/Plan:  1. Need for vaccination    - Pneumococcal Conjugate Vaccine 20-Valent (6 wks+)    2. Primary insomnia  Refill of trazodone sent in  - traZODone (DESYREL) 50 MG Tab; Take 1 Tablet by mouth every evening.  Dispense: 90 Tablet; Refill: 3    3. Lumbar radiculopathy  PDMP checked. Continue norco 10/325mg  - Controlled Substance Treatment Agreement  - PAIN MANAGEMENT SCRN, W/ RFLX TO QNT; Future  - HYDROcodone/acetaminophen (NORCO)  MG Tab; Take 1 Tablet by mouth every 8 hours as needed for Severe Pain for up to 30 days.  Dispense: 90 Tablet; Refill: 0  - HYDROcodone/acetaminophen (NORCO)  MG Tab; Take 1 Tablet by mouth every 8 hours as needed for Severe Pain for up to 30 days.  Dispense: 90 Tablet; Refill: 0  - HYDROcodone/acetaminophen (NORCO)  MG Tab; Take 1 Tablet by mouth every 8 hours as needed for Severe Pain for up to 30 days.  Dispense: 90 Tablet; Refill: 0    4. Physical exam, annual  Labs " printed  - CBC WITH DIFFERENTIAL; Future  - Comp Metabolic Panel; Future  - HEMOGLOBIN A1C; Future  - Lipid Profile; Future  - TSH WITH REFLEX TO FT4; Future    5. Attention deficit hyperactivity disorder (ADHD), predominantly inattentive type  Switch to immediate release adderall 20mg  - amphetamine-dextroamphetamine (ADDERALL) 20 MG Tab; Take 1 Tablet by mouth 2 times a day for 30 days.  Dispense: 60 Tablet; Refill: 0  - amphetamine-dextroamphetamine (ADDERALL) 20 MG Tab; Take 1 Tablet by mouth 2 times a day for 30 days.  Dispense: 60 Tablet; Refill: 0  - amphetamine-dextroamphetamine (ADDERALL) 20 MG Tab; Take 1 Tablet by mouth 2 times a day for 30 days.  Dispense: 60 Tablet; Refill: 0

## 2025-02-15 LAB
AMPHET CTO UR CFM-MCNC: NORMAL NG/ML
BARBITURATES CTO UR CFM-MCNC: NEGATIVE NG/ML
BENZODIAZ CTO UR CFM-MCNC: NEGATIVE NG/ML
BUPRENORPHINE UR-MCNC: NEGATIVE NG/ML
CANNABINOIDS CTO UR CFM-MCNC: NEGATIVE NG/ML
CARISOPRODOL UR-MCNC: NEGATIVE NG/ML
COCAINE CTO UR CFM-MCNC: NEGATIVE NG/ML
CREAT UR-MCNC: 56.9 MG/DL (ref 20–400)
DRUG SCREEN COMMENT UR-IMP: NORMAL
ETHYL GLUCURONIDE UR QL SCN: NEGATIVE NG/ML
FENTANYL UR-MCNC: NEGATIVE NG/ML
MEPERIDINE CTO UR SCN-MCNC: NEGATIVE NG/ML
METHADONE CTO UR CFM-MCNC: NEGATIVE NG/ML
OPIATES UR QL SCN: NORMAL NG/ML
OXYCDOXYM URSCRN 2005102: NEGATIVE NG/ML
PCP CTO UR CFM-MCNC: NEGATIVE NG/ML
PROPOXYPH CTO UR CFM-MCNC: NEGATIVE NG/ML
TAPENTADOL UR-MCNC: NEGATIVE NG/ML
TRAMADOL CTO UR SCN-MCNC: NEGATIVE NG/ML
ZOLPIDEM UR-MCNC: NEGATIVE NG/ML

## 2025-02-18 LAB
6MAM UR CFM-MCNC: <10 NG/ML
CODEINE UR CFM-MCNC: <20 NG/ML
HYDROCODONE UR CFM-MCNC: 901 NG/ML
HYDROMORPHONE UR CFM-MCNC: <20 NG/ML
MORPHINE UR CFM-MCNC: <20 NG/ML
NORHYDROCODONE UR CFM-MCNC: 655 NG/ML
NOROXYCODONE UR CFM-MCNC: <20 NG/ML
OPIATES UR NOROXYM Q0836: <20 NG/ML
OXYCODONE UR CFM-MCNC: <20 NG/ML
OXYMORPHONE UR CFM-MCNC: <20 NG/ML

## 2025-02-19 LAB
AMPHET UR CFM-MCNC: 1191 NG/ML
MDA UR CFM-MCNC: <200 NG/ML
MDEA UR CFM-MCNC: <200 NG/ML
MDMA UR CFM-MCNC: <200 NG/ML
METHAMPHET UR CFM-MCNC: <200 NG/ML
PHENTERMINE UR CFM-MCNC: <200 NG/ML

## 2025-03-17 DIAGNOSIS — I10 PRIMARY HYPERTENSION: ICD-10-CM

## 2025-03-17 DIAGNOSIS — M54.16 LUMBAR RADICULOPATHY: ICD-10-CM

## 2025-03-17 RX ORDER — TIZANIDINE HYDROCHLORIDE 4 MG/1
CAPSULE, GELATIN COATED ORAL
Qty: 90 CAPSULE | Refills: 1 | Status: SHIPPED | OUTPATIENT
Start: 2025-03-17

## 2025-03-17 RX ORDER — AMLODIPINE BESYLATE 10 MG/1
10 TABLET ORAL
Qty: 90 TABLET | Refills: 1 | Status: SHIPPED | OUTPATIENT
Start: 2025-03-17

## 2025-03-17 NOTE — TELEPHONE ENCOUNTER
Received request via: Pharmacy    Was the patient seen in the last year in this department? Yes    Does the patient have an active prescription (recently filled or refills available) for medication(s) requested? No    Pharmacy Name: Pharmacy:   [id number] - [place], [place] - [address]     Does the patient have skilled nursing Plus and need 100-day supply? (This applies to ALL medications) Patient does not have SCP

## 2025-04-10 DIAGNOSIS — F90.0 ATTENTION DEFICIT HYPERACTIVITY DISORDER (ADHD), PREDOMINANTLY INATTENTIVE TYPE: ICD-10-CM

## 2025-04-10 NOTE — TELEPHONE ENCOUNTER
Received request via: Pharmacy    Was the patient seen in the last year in this department? Yes    Does the patient have an active prescription (recently filled or refills available) for medication(s) requested? No    Pharmacy Name: [id number] - [place], [place] - [address]     Does the patient have custodial Plus and need 100-day supply? (This applies to ALL medications) Patient does not have SCP

## 2025-04-17 RX ORDER — DEXTROAMPHETAMINE SACCHARATE, AMPHETAMINE ASPARTATE, DEXTROAMPHETAMINE SULFATE AND AMPHETAMINE SULFATE 5; 5; 5; 5 MG/1; MG/1; MG/1; MG/1
20 TABLET ORAL 2 TIMES DAILY
Qty: 60 TABLET | Refills: 0 | Status: SHIPPED | OUTPATIENT
Start: 2025-04-17 | End: 2025-05-17

## 2025-05-05 ENCOUNTER — HOSPITAL ENCOUNTER (OUTPATIENT)
Dept: LAB | Facility: MEDICAL CENTER | Age: 60
End: 2025-05-05
Attending: PHYSICIAN ASSISTANT
Payer: OTHER GOVERNMENT

## 2025-05-05 DIAGNOSIS — Z00.00 PHYSICAL EXAM, ANNUAL: ICD-10-CM

## 2025-05-05 LAB
ALBUMIN SERPL BCP-MCNC: 4 G/DL (ref 3.2–4.9)
ALBUMIN/GLOB SERPL: 1.6 G/DL
ALP SERPL-CCNC: 101 U/L (ref 30–99)
ALT SERPL-CCNC: 18 U/L (ref 2–50)
ANION GAP SERPL CALC-SCNC: 7 MMOL/L (ref 7–16)
AST SERPL-CCNC: 21 U/L (ref 12–45)
BASOPHILS # BLD AUTO: 0.7 % (ref 0–1.8)
BASOPHILS # BLD: 0.05 K/UL (ref 0–0.12)
BILIRUB SERPL-MCNC: 0.5 MG/DL (ref 0.1–1.5)
BUN SERPL-MCNC: 14 MG/DL (ref 8–22)
CALCIUM ALBUM COR SERPL-MCNC: 8.8 MG/DL (ref 8.5–10.5)
CALCIUM SERPL-MCNC: 8.8 MG/DL (ref 8.5–10.5)
CHLORIDE SERPL-SCNC: 107 MMOL/L (ref 96–112)
CHOLEST SERPL-MCNC: 134 MG/DL (ref 100–199)
CO2 SERPL-SCNC: 26 MMOL/L (ref 20–33)
CREAT SERPL-MCNC: 0.95 MG/DL (ref 0.5–1.4)
EOSINOPHIL # BLD AUTO: 0.17 K/UL (ref 0–0.51)
EOSINOPHIL NFR BLD: 2.5 % (ref 0–6.9)
ERYTHROCYTE [DISTWIDTH] IN BLOOD BY AUTOMATED COUNT: 43.8 FL (ref 35.9–50)
EST. AVERAGE GLUCOSE BLD GHB EST-MCNC: 114 MG/DL
FASTING STATUS PATIENT QL REPORTED: NORMAL
GFR SERPLBLD CREATININE-BSD FMLA CKD-EPI: 92 ML/MIN/1.73 M 2
GLOBULIN SER CALC-MCNC: 2.5 G/DL (ref 1.9–3.5)
GLUCOSE SERPL-MCNC: 79 MG/DL (ref 65–99)
HBA1C MFR BLD: 5.6 % (ref 4–5.6)
HCT VFR BLD AUTO: 40.1 % (ref 42–52)
HDLC SERPL-MCNC: 36 MG/DL
HGB BLD-MCNC: 12.7 G/DL (ref 14–18)
IMM GRANULOCYTES # BLD AUTO: 0.01 K/UL (ref 0–0.11)
IMM GRANULOCYTES NFR BLD AUTO: 0.1 % (ref 0–0.9)
LDLC SERPL CALC-MCNC: 65 MG/DL
LYMPHOCYTES # BLD AUTO: 1.48 K/UL (ref 1–4.8)
LYMPHOCYTES NFR BLD: 21.9 % (ref 22–41)
MCH RBC QN AUTO: 28.8 PG (ref 27–33)
MCHC RBC AUTO-ENTMCNC: 31.7 G/DL (ref 32.3–36.5)
MCV RBC AUTO: 90.9 FL (ref 81.4–97.8)
MONOCYTES # BLD AUTO: 0.44 K/UL (ref 0–0.85)
MONOCYTES NFR BLD AUTO: 6.5 % (ref 0–13.4)
NEUTROPHILS # BLD AUTO: 4.61 K/UL (ref 1.82–7.42)
NEUTROPHILS NFR BLD: 68.3 % (ref 44–72)
NRBC # BLD AUTO: 0 K/UL
NRBC BLD-RTO: 0 /100 WBC (ref 0–0.2)
PLATELET # BLD AUTO: 224 K/UL (ref 164–446)
PMV BLD AUTO: 10 FL (ref 9–12.9)
POTASSIUM SERPL-SCNC: 4.6 MMOL/L (ref 3.6–5.5)
PROT SERPL-MCNC: 6.5 G/DL (ref 6–8.2)
RBC # BLD AUTO: 4.41 M/UL (ref 4.7–6.1)
SODIUM SERPL-SCNC: 140 MMOL/L (ref 135–145)
TRIGL SERPL-MCNC: 166 MG/DL (ref 0–149)
TSH SERPL DL<=0.005 MIU/L-ACNC: 0.84 UIU/ML (ref 0.38–5.33)
WBC # BLD AUTO: 6.8 K/UL (ref 4.8–10.8)

## 2025-05-05 PROCEDURE — 80053 COMPREHEN METABOLIC PANEL: CPT

## 2025-05-05 PROCEDURE — 83036 HEMOGLOBIN GLYCOSYLATED A1C: CPT

## 2025-05-05 PROCEDURE — 80061 LIPID PANEL: CPT

## 2025-05-05 PROCEDURE — 84443 ASSAY THYROID STIM HORMONE: CPT

## 2025-05-05 PROCEDURE — 85025 COMPLETE CBC W/AUTO DIFF WBC: CPT

## 2025-05-05 PROCEDURE — 36415 COLL VENOUS BLD VENIPUNCTURE: CPT

## 2025-05-06 ENCOUNTER — RESULTS FOLLOW-UP (OUTPATIENT)
Dept: MEDICAL GROUP | Facility: PHYSICIAN GROUP | Age: 60
End: 2025-05-06

## 2025-05-08 ENCOUNTER — APPOINTMENT (OUTPATIENT)
Dept: MEDICAL GROUP | Facility: PHYSICIAN GROUP | Age: 60
End: 2025-05-08
Payer: OTHER GOVERNMENT

## 2025-05-08 VITALS
WEIGHT: 230 LBS | DIASTOLIC BLOOD PRESSURE: 80 MMHG | TEMPERATURE: 97 F | OXYGEN SATURATION: 98 % | RESPIRATION RATE: 14 BRPM | HEIGHT: 73 IN | HEART RATE: 81 BPM | SYSTOLIC BLOOD PRESSURE: 110 MMHG | BODY MASS INDEX: 30.48 KG/M2

## 2025-05-08 DIAGNOSIS — F90.0 ATTENTION DEFICIT HYPERACTIVITY DISORDER (ADHD), PREDOMINANTLY INATTENTIVE TYPE: ICD-10-CM

## 2025-05-08 DIAGNOSIS — D64.9 LOW HEMOGLOBIN: ICD-10-CM

## 2025-05-08 DIAGNOSIS — M54.16 LUMBAR RADICULOPATHY: ICD-10-CM

## 2025-05-08 PROCEDURE — 3074F SYST BP LT 130 MM HG: CPT | Performed by: PHYSICIAN ASSISTANT

## 2025-05-08 PROCEDURE — 3079F DIAST BP 80-89 MM HG: CPT | Performed by: PHYSICIAN ASSISTANT

## 2025-05-08 PROCEDURE — 99214 OFFICE O/P EST MOD 30 MIN: CPT | Performed by: PHYSICIAN ASSISTANT

## 2025-05-08 RX ORDER — HYDROCODONE BITARTRATE AND ACETAMINOPHEN 10; 325 MG/1; MG/1
1 TABLET ORAL EVERY 8 HOURS PRN
Qty: 90 TABLET | Refills: 0 | Status: SHIPPED | OUTPATIENT
Start: 2025-06-07 | End: 2025-07-07

## 2025-05-08 RX ORDER — DEXTROAMPHETAMINE SACCHARATE, AMPHETAMINE ASPARTATE, DEXTROAMPHETAMINE SULFATE AND AMPHETAMINE SULFATE 5; 5; 5; 5 MG/1; MG/1; MG/1; MG/1
20 TABLET ORAL 2 TIMES DAILY
Qty: 60 TABLET | Refills: 0 | Status: SHIPPED | OUTPATIENT
Start: 2025-05-08 | End: 2025-06-07

## 2025-05-08 RX ORDER — DEXTROAMPHETAMINE SACCHARATE, AMPHETAMINE ASPARTATE, DEXTROAMPHETAMINE SULFATE AND AMPHETAMINE SULFATE 5; 5; 5; 5 MG/1; MG/1; MG/1; MG/1
20 TABLET ORAL 2 TIMES DAILY
Qty: 60 TABLET | Refills: 0 | Status: SHIPPED | OUTPATIENT
Start: 2025-07-07 | End: 2025-08-06

## 2025-05-08 RX ORDER — HYDROCODONE BITARTRATE AND ACETAMINOPHEN 10; 325 MG/1; MG/1
1 TABLET ORAL EVERY 8 HOURS PRN
Qty: 90 TABLET | Refills: 0 | Status: SHIPPED | OUTPATIENT
Start: 2025-05-08 | End: 2025-06-07

## 2025-05-08 RX ORDER — HYDROCODONE BITARTRATE AND ACETAMINOPHEN 10; 325 MG/1; MG/1
1 TABLET ORAL EVERY 8 HOURS PRN
Qty: 90 TABLET | Refills: 0 | Status: SHIPPED | OUTPATIENT
Start: 2025-07-07 | End: 2025-08-06

## 2025-05-08 RX ORDER — DEXTROAMPHETAMINE SACCHARATE, AMPHETAMINE ASPARTATE, DEXTROAMPHETAMINE SULFATE AND AMPHETAMINE SULFATE 5; 5; 5; 5 MG/1; MG/1; MG/1; MG/1
20 TABLET ORAL 2 TIMES DAILY
Qty: 60 TABLET | Refills: 0 | Status: SHIPPED | OUTPATIENT
Start: 2025-06-07 | End: 2025-07-07

## 2025-05-08 ASSESSMENT — FIBROSIS 4 INDEX: FIB4 SCORE: 1.281631040900617388

## 2025-05-08 NOTE — PROGRESS NOTES
CC:  Chief Complaint   Patient presents with    Lab Results    Medication Refill     Norco        HISTORY OF PRESENT ILLNESS: Patient is a 59 y.o. male established patient presenting with issues below  Patient due for refills of his Adderall for his ADHD.  He reports that immediate release Adderall is working much better than the extended release  Due for refill of his Norco for his chronic lumbar radiculopathy.  Patient notes that he is considering a horse trip in [place] in the upcoming months but is very hesitant.  Patient's hemoglobin has been trending down in the past year and is currently at 12.7.  Denies any blood loss    Current Outpatient Medications   Medication Sig Dispense Refill    amphetamine-dextroamphetamine (ADDERALL) 20 MG Tab Take 1 Tablet by mouth 2 times a day for 30 days. 60 Tablet 0    amLODIPine (NORVASC) 10 MG Tab TAKE ONE TABLET BY MOUTH EVERY DAY 90 Tablet 1    tizanidine (ZANAFLEX) 4 MG capsule TAKE ONE CAPSULE BY MOUTH EVERY DAY AS NEEDED FOR PAIN 90 Capsule 1    HYDROcodone/acetaminophen (NORCO)  MG Tab Take 1 Tablet by mouth every 8 hours as needed for Severe Pain for up to 30 days. 90 Tablet 0    amphetamine-dextroamphetamine (ADDERALL) 20 MG Tab Take 1 Tablet by mouth 2 times a day for 30 days. 60 Tablet 0    traZODone (DESYREL) 50 MG Tab Take 1 Tablet by mouth every evening. 90 Tablet 3    sildenafil citrate (VIAGRA) 100 MG tablet Take 1 Tablet by mouth as needed for Erectile Dysfunction. 30 Tablet 0    buPROPion (WELLBUTRIN XL) 300 MG XL tablet Take 1 Tablet by mouth every morning. 90 Tablet 3    atorvastatin (LIPITOR) 80 MG tablet TAKE ONE TABLET BY MOUTH EVERY DAY 90 Tablet 3    enalapril (VASOTEC) 20 MG tablet Take 1.5 Tablets by mouth every day. 135 Tablet 3    pantoprazole (PROTONIX) 40 MG Tablet Delayed Response Take 1 Tablet by mouth every day. 90 Tablet 3    Naloxone (NARCAN) 4 MG/0.1ML Liquid One spray in one nostril for overdose and call 911. 1 Each 0    ibuprofen  "(MOTRIN) 200 MG Tab Take 4 Tablets by mouth.      traMADol (ULTRAM) 50 MG Tab Take 25 mg by mouth. (Patient not taking: Reported on 5/8/2025)      ketorolac (TORADOL) 10 MG Tab Take 10 mg by mouth. (Patient not taking: Reported on 5/8/2025)      ondansetron (ZOFRAN ODT) 4 MG TABLET DISPERSIBLE Take 4 mg by mouth. (Patient not taking: Reported on 5/8/2025)      fluticasone (FLOVENT HFA) 110 MCG/ACT Aerosol Inhale 1 Puff 2 times a day. (Patient not taking: Reported on 8/14/2024) 3 Each 3     No current facility-administered medications for this visit.        ROS:     ROS    Exam:    /80   Pulse 81   Temp 36.1 °C (97 °F) (Temporal)   Resp 14   Ht 1.854 m (6' 1\")   Wt 104 kg (230 lb)   SpO2 98%  Body mass index is 30.34 kg/m².    General:  Well nourished, well developed male in NAD  Head is grossly normal.  Neck: Supple.   Skin: Warm and dry. No obvious lesions  Neuro: Normal muscle tone. Gait normal. Alert and oriented.  Psych: Normal mood and affect      Please note that this dictation was created using voice recognition software. I have made every reasonable attempt to correct obvious errors, but I expect that there are errors of grammar and possibly content that I did not discover before finalizing the note.        Assessment/Plan:    1. Attention deficit hyperactivity disorder (ADHD), predominantly inattentive type  PDMP checked. Refill sent in.  Continue with Adderall immediate least 20 mg.  - amphetamine-dextroamphetamine (ADDERALL) 20 MG Tab; Take 1 Tablet by mouth 2 times a day for 30 days.  Dispense: 60 Tablet; Refill: 0  - amphetamine-dextroamphetamine (ADDERALL) 20 MG Tab; Take 1 Tablet by mouth 2 times a day for 30 days.  Dispense: 60 Tablet; Refill: 0  - amphetamine-dextroamphetamine (ADDERALL) 20 MG Tab; Take 1 Tablet by mouth 2 times a day for 30 days.  Dispense: 60 Tablet; Refill: 0    2. Lumbar radiculopathy  Well-controlled with Norco 10 mg.  Continue with current dose  - " HYDROcodone/acetaminophen (NORCO)  MG Tab; Take 1 Tablet by mouth every 8 hours as needed for Severe Pain for up to 30 days.  Dispense: 90 Tablet; Refill: 0  - HYDROcodone/acetaminophen (NORCO)  MG Tab; Take 1 Tablet by mouth every 8 hours as needed for Severe Pain for up to 30 days.  Dispense: 90 Tablet; Refill: 0  - HYDROcodone/acetaminophen (NORCO)  MG Tab; Take 1 Tablet by mouth every 8 hours as needed for Severe Pain for up to 30 days.  Dispense: 90 Tablet; Refill: 0    3. Low hemoglobin  Recheck CBC and several other labs in 6 months.  - CBC WITH DIFFERENTIAL; Future  - IRON/TOTAL IRON BIND; Future  - FERRITIN; Future  - FOLATE; Future  - VITAMIN B12; Future

## 2025-07-14 DIAGNOSIS — N52.9 ERECTILE DYSFUNCTION, UNSPECIFIED ERECTILE DYSFUNCTION TYPE: ICD-10-CM

## 2025-07-14 RX ORDER — SILDENAFIL 100 MG/1
100 TABLET, FILM COATED ORAL PRN
Qty: 30 TABLET | Refills: 0 | Status: SHIPPED | OUTPATIENT
Start: 2025-07-14

## 2025-07-14 NOTE — TELEPHONE ENCOUNTER
Received request via: Patient    Was the patient seen in the last year in this department? Yes    Does the patient have an active prescription (recently filled or refills available) for medication(s) requested? No    [facility]    Does the patient have senior living Plus and need 100-day supply? (This applies to ALL medications) Patient does not have SCP   oral

## 2025-08-07 ENCOUNTER — OFFICE VISIT (OUTPATIENT)
Dept: MEDICAL GROUP | Facility: PHYSICIAN GROUP | Age: 60
End: 2025-08-07
Payer: OTHER GOVERNMENT

## 2025-08-07 VITALS
WEIGHT: 224 LBS | OXYGEN SATURATION: 97 % | TEMPERATURE: 98.5 F | BODY MASS INDEX: 29.69 KG/M2 | RESPIRATION RATE: 12 BRPM | HEART RATE: 97 BPM | HEIGHT: 73 IN | SYSTOLIC BLOOD PRESSURE: 140 MMHG | DIASTOLIC BLOOD PRESSURE: 108 MMHG

## 2025-08-07 DIAGNOSIS — E78.2 MIXED HYPERLIPIDEMIA: ICD-10-CM

## 2025-08-07 DIAGNOSIS — F51.01 PRIMARY INSOMNIA: ICD-10-CM

## 2025-08-07 DIAGNOSIS — M54.16 LUMBAR RADICULOPATHY: Primary | ICD-10-CM

## 2025-08-07 DIAGNOSIS — I10 PRIMARY HYPERTENSION: ICD-10-CM

## 2025-08-07 PROCEDURE — 99214 OFFICE O/P EST MOD 30 MIN: CPT | Performed by: PHYSICIAN ASSISTANT

## 2025-08-07 PROCEDURE — 3077F SYST BP >= 140 MM HG: CPT | Performed by: PHYSICIAN ASSISTANT

## 2025-08-07 PROCEDURE — 3080F DIAST BP >= 90 MM HG: CPT | Performed by: PHYSICIAN ASSISTANT

## 2025-08-07 RX ORDER — HYDROCODONE BITARTRATE AND ACETAMINOPHEN 10; 325 MG/1; MG/1
1 TABLET ORAL EVERY 8 HOURS PRN
Qty: 90 TABLET | Refills: 0 | Status: SHIPPED | OUTPATIENT
Start: 2025-10-06 | End: 2025-11-05

## 2025-08-07 RX ORDER — ENALAPRIL MALEATE 20 MG/1
30 TABLET ORAL
Qty: 135 TABLET | Refills: 3 | Status: SHIPPED | OUTPATIENT
Start: 2025-08-07

## 2025-08-07 RX ORDER — HYDROCODONE BITARTRATE AND ACETAMINOPHEN 10; 325 MG/1; MG/1
1 TABLET ORAL 3 TIMES DAILY PRN
COMMUNITY
Start: 2025-05-15 | End: 2025-08-07 | Stop reason: SDUPTHER

## 2025-08-07 RX ORDER — ATORVASTATIN CALCIUM 80 MG/1
80 TABLET, FILM COATED ORAL
Qty: 90 TABLET | Refills: 3 | Status: SHIPPED | OUTPATIENT
Start: 2025-08-07

## 2025-08-07 RX ORDER — HYDROCODONE BITARTRATE AND ACETAMINOPHEN 10; 325 MG/1; MG/1
1 TABLET ORAL EVERY 8 HOURS PRN
Qty: 90 TABLET | Refills: 0 | Status: SHIPPED | OUTPATIENT
Start: 2025-08-07 | End: 2025-09-06

## 2025-08-07 RX ORDER — DEXTROAMPHETAMINE SACCHARATE, AMPHETAMINE ASPARTATE MONOHYDRATE, DEXTROAMPHETAMINE SULFATE AND AMPHETAMINE SULFATE 5; 5; 5; 5 MG/1; MG/1; MG/1; MG/1
20 CAPSULE, EXTENDED RELEASE ORAL 2 TIMES DAILY
COMMUNITY
Start: 2025-05-15

## 2025-08-07 RX ORDER — TRAZODONE HYDROCHLORIDE 50 MG/1
50 TABLET ORAL NIGHTLY
Qty: 90 TABLET | Refills: 3 | Status: SHIPPED | OUTPATIENT
Start: 2025-08-07

## 2025-08-07 RX ORDER — TIZANIDINE HYDROCHLORIDE 4 MG/1
4 CAPSULE, GELATIN COATED ORAL
Qty: 90 CAPSULE | Refills: 1 | Status: SHIPPED | OUTPATIENT
Start: 2025-08-07

## 2025-08-07 RX ORDER — BUPROPION HYDROCHLORIDE 300 MG/1
300 TABLET ORAL EVERY MORNING
Qty: 90 TABLET | Refills: 3 | Status: SHIPPED | OUTPATIENT
Start: 2025-08-07

## 2025-08-07 RX ORDER — HYDROCODONE BITARTRATE AND ACETAMINOPHEN 10; 325 MG/1; MG/1
1 TABLET ORAL EVERY 8 HOURS PRN
Qty: 90 TABLET | Refills: 0 | Status: SHIPPED | OUTPATIENT
Start: 2025-09-06 | End: 2025-10-06

## 2025-08-07 RX ORDER — AMLODIPINE BESYLATE 10 MG/1
10 TABLET ORAL
Qty: 90 TABLET | Refills: 1 | Status: SHIPPED | OUTPATIENT
Start: 2025-08-07

## 2025-08-07 ASSESSMENT — FIBROSIS 4 INDEX: FIB4 SCORE: 1.3
